# Patient Record
Sex: MALE | Race: WHITE | NOT HISPANIC OR LATINO | Employment: FULL TIME | URBAN - METROPOLITAN AREA
[De-identification: names, ages, dates, MRNs, and addresses within clinical notes are randomized per-mention and may not be internally consistent; named-entity substitution may affect disease eponyms.]

---

## 2017-09-22 ENCOUNTER — HOSPITAL ENCOUNTER (OUTPATIENT)
Dept: RADIOLOGY | Facility: HOSPITAL | Age: 42
Discharge: HOME | End: 2017-09-22
Attending: NURSE PRACTITIONER
Payer: MEDICAID

## 2017-09-22 DIAGNOSIS — M25.522 ARTHRALGIA OF ELBOW, LEFT: ICD-10-CM

## 2017-09-22 PROCEDURE — 73080 X-RAY EXAM OF ELBOW: CPT | Mod: 26,LT,, | Performed by: RADIOLOGY

## 2017-09-22 PROCEDURE — 73080 X-RAY EXAM OF ELBOW: CPT | Mod: TC,LT

## 2018-04-13 ENCOUNTER — OFFICE VISIT (OUTPATIENT)
Dept: FAMILY MEDICINE | Facility: CLINIC | Age: 43
End: 2018-04-13
Payer: COMMERCIAL

## 2018-04-13 ENCOUNTER — HOSPITAL ENCOUNTER (OUTPATIENT)
Dept: RADIOLOGY | Facility: HOSPITAL | Age: 43
Discharge: HOME | End: 2018-04-13
Attending: FAMILY MEDICINE
Payer: COMMERCIAL

## 2018-04-13 VITALS
DIASTOLIC BLOOD PRESSURE: 94 MMHG | HEART RATE: 60 BPM | HEIGHT: 72 IN | SYSTOLIC BLOOD PRESSURE: 144 MMHG | BODY MASS INDEX: 29.68 KG/M2 | RESPIRATION RATE: 20 BRPM | WEIGHT: 219.13 LBS | TEMPERATURE: 97 F

## 2018-04-13 DIAGNOSIS — R07.9 CHEST PAIN, UNSPECIFIED TYPE: ICD-10-CM

## 2018-04-13 DIAGNOSIS — R07.9 CHEST PAIN, UNSPECIFIED TYPE: Primary | ICD-10-CM

## 2018-04-13 DIAGNOSIS — F17.210 MODERATE SMOKER (20 OR LESS PER DAY): ICD-10-CM

## 2018-04-13 DIAGNOSIS — I10 ESSENTIAL HYPERTENSION: ICD-10-CM

## 2018-04-13 PROCEDURE — 93005 ELECTROCARDIOGRAM TRACING: CPT | Mod: S$GLB,,, | Performed by: FAMILY MEDICINE

## 2018-04-13 PROCEDURE — 3080F DIAST BP >= 90 MM HG: CPT | Mod: CPTII,S$GLB,, | Performed by: FAMILY MEDICINE

## 2018-04-13 PROCEDURE — 99203 OFFICE O/P NEW LOW 30 MIN: CPT | Mod: S$GLB,,, | Performed by: FAMILY MEDICINE

## 2018-04-13 PROCEDURE — 3077F SYST BP >= 140 MM HG: CPT | Mod: CPTII,S$GLB,, | Performed by: FAMILY MEDICINE

## 2018-04-13 PROCEDURE — 93010 ELECTROCARDIOGRAM REPORT: CPT | Mod: S$GLB,,, | Performed by: INTERNAL MEDICINE

## 2018-04-13 PROCEDURE — 99999 PR PBB SHADOW E&M-EST. PATIENT-LVL III: CPT | Mod: PBBFAC,,, | Performed by: FAMILY MEDICINE

## 2018-04-13 PROCEDURE — 71046 X-RAY EXAM CHEST 2 VIEWS: CPT | Mod: 26,,, | Performed by: RADIOLOGY

## 2018-04-13 PROCEDURE — 71046 X-RAY EXAM CHEST 2 VIEWS: CPT | Mod: TC,FY,PO

## 2018-04-13 RX ORDER — VARENICLINE TARTRATE 0.5 (11)-1
KIT ORAL
Qty: 1 PACKAGE | Refills: 0 | Status: SHIPPED | OUTPATIENT
Start: 2018-04-13 | End: 2018-06-08

## 2018-04-13 RX ORDER — LISINOPRIL AND HYDROCHLOROTHIAZIDE 10; 12.5 MG/1; MG/1
1 TABLET ORAL DAILY
Qty: 90 TABLET | Refills: 3 | Status: SHIPPED | OUTPATIENT
Start: 2018-04-13 | End: 2021-04-09

## 2018-04-13 NOTE — PROGRESS NOTES
Chief Complaint:    Chief Complaint   Patient presents with    [facility]    Hypertension    Chest Pain     on Monday with deep breath       History of Present Illness:  Patient is new to the practice.  He says few days back he developed chest pain that lasted off and on for almost all day and was associated with deep breathing.  He denies any fever cough congestion diaphoresis no chest pain associated with exertion or walking.  No known history of heart disease.    He was diagnosed with hypertension and started on amlodipine about a year or so back he developed edema in his legs so he stopped using the medication.    He is also smoker he does want to quit he has about a 31-pack-year history of smoking      ROS:  Review of Systems   Constitutional: Negative for activity change, chills, fatigue, fever and unexpected weight change.   HENT: Negative for congestion, ear discharge, ear pain, hearing loss, postnasal drip and rhinorrhea.    Eyes: Negative for pain and visual disturbance.   Respiratory: Negative for cough, chest tightness and shortness of breath.    Cardiovascular: Negative for chest pain and palpitations.   Gastrointestinal: Negative for abdominal pain, diarrhea and vomiting.   Endocrine: Negative for heat intolerance.   Genitourinary: Negative for dysuria, flank pain, frequency and hematuria.   Musculoskeletal: Negative for back pain, gait problem and neck pain.   Skin: Negative for color change and rash.   Neurological: Negative for dizziness, tremors, seizures, numbness and headaches.   Psychiatric/Behavioral: Negative for agitation, hallucinations, self-injury, sleep disturbance and suicidal ideas. The patient is not nervous/anxious.        Past Medical History:   Diagnosis Date    Hypertension        Social History:  Social History     Social History    Marital status: Single     Spouse name: N/A    Number of children: N/A    Years of education: N/A     Social History Main Topics     "Smoking status: Current Every Day Smoker     Packs/day: 1.00    Smokeless tobacco: Current User     Types: Snuff    Alcohol use 4.8 oz/week     6 Cans of beer, 2 Standard drinks or equivalent per week    Drug use: No    Sexual activity: Yes     Partners: Female     Other Topics Concern    None     Social History Narrative    None       Family History:   family history includes Cirrhosis in his father.    Health Maintenance   Topic Date Due    Lipid Panel  1974    TETANUS VACCINE  04/26/1992    Pneumococcal PPSV23 (Medium Risk) (1) 04/26/1992    Influenza Vaccine  08/01/2017       Physical Exam:    Vital Signs  Temp: 97.3 °F (36.3 °C)  Temp src: Tympanic  Pulse: 60  Resp: 20  BP: (!) 144/94  BP Location: Left arm  Patient Position: Sitting  Pain Score: 0-No pain  Height and Weight  Height: 5' 11.75" (182.2 cm)  Weight: 99.4 kg (219 lb 2.2 oz)  BSA (Calculated - sq m): 2.24 sq meters  BMI (Calculated): 30  Weight in (lb) to have BMI = 25: 182.7]    Body mass index is 29.93 kg/m².    Physical Exam   Constitutional: He is oriented to person, place, and time. He appears well-developed.   Eyes: Conjunctivae are normal. Pupils are equal, round, and reactive to light.   Neck: Normal range of motion. Neck supple.   Cardiovascular: Normal rate, regular rhythm and normal heart sounds.    No murmur heard.  Pulmonary/Chest: Effort normal and breath sounds normal. No respiratory distress. He has no wheezes. He has no rales. He exhibits no tenderness.   Abdominal: Soft. He exhibits no distension and no mass. There is no tenderness. There is no guarding.   Musculoskeletal: He exhibits no edema or tenderness.   Lymphadenopathy:     He has no cervical adenopathy.   Neurological: He is alert and oriented to person, place, and time. He has normal reflexes.   Skin: Skin is warm and dry.   Psychiatric: He has a normal mood and affect. His behavior is normal. Judgment and thought content normal.       No results found for: " CHOL, TRIG, HDL, TOTALCHOLEST, NONHDLCHOL    No results found for: HGBA1C    Assessment:      ICD-10-CM ICD-9-CM   1. Chest pain, unspecified type R07.9 786.50   2. Essential hypertension I10 401.9   3. Moderate smoker (20 or less per day) F17.210 305.1         Plan:  1.  Chest pain most likely right in nature however he is a high-risk patient because of his smoking history and hypertension will get a treadmill test.  Will also do a chest x-ray EKG.  2.  Hypertension we will start him on Prinzide 10-12 0.5 daily please start monitoring blood pressure carefully recheck a BMP at his next visit bring the readings  3.  Tobaccoism start on Chantix side effects of Chantix discussed stop the medicine if adverse effect including but not limited to depression nightmares and suicidal ideation develops call emergency services  Follow up in one month.  Orders Placed This Encounter   Procedures    X-Ray Chest PA And Lateral    CBC auto differential    Comprehensive metabolic panel    Hemoglobin A1c    Lipid panel    Cardiac treadmill stress test    EKG 12-lead       Current Outpatient Prescriptions   Medication Sig Dispense Refill    lisinopril-hydrochlorothiazide (PRINZIDE,ZESTORETIC) 10-12.5 mg per tablet Take 1 tablet by mouth once daily. 90 tablet 3    varenicline (CHANTIX STARTING MONTH BOX) 0.5 mg (11)- 1 mg (42) tablet Take one 0.5mg tab by mouth once daily X3 days,then increase to one 0.5mg tab twice daily X4 days,then increase to one 1mg tab twice daily 1 Package 0     No current facility-administered medications for this visit.        There are no discontinued medications.    Follow-up in about 2 weeks (around 4/27/2018).      Alexsandra Howe MD

## 2018-04-16 ENCOUNTER — TELEPHONE (OUTPATIENT)
Dept: FAMILY MEDICINE | Facility: CLINIC | Age: 43
End: 2018-04-16

## 2018-04-16 DIAGNOSIS — R93.89 ABNORMAL CHEST X-RAY: Primary | ICD-10-CM

## 2018-05-02 ENCOUNTER — OFFICE VISIT (OUTPATIENT)
Dept: FAMILY MEDICINE | Facility: CLINIC | Age: 43
End: 2018-05-02
Payer: COMMERCIAL

## 2018-05-02 ENCOUNTER — CLINICAL SUPPORT (OUTPATIENT)
Dept: CARDIOLOGY | Facility: CLINIC | Age: 43
End: 2018-05-02
Attending: FAMILY MEDICINE
Payer: COMMERCIAL

## 2018-05-02 ENCOUNTER — HOSPITAL ENCOUNTER (OUTPATIENT)
Dept: RADIOLOGY | Facility: HOSPITAL | Age: 43
Discharge: HOME | End: 2018-05-02
Attending: FAMILY MEDICINE
Payer: COMMERCIAL

## 2018-05-02 VITALS
BODY MASS INDEX: 29.62 KG/M2 | OXYGEN SATURATION: 95 % | SYSTOLIC BLOOD PRESSURE: 113 MMHG | DIASTOLIC BLOOD PRESSURE: 73 MMHG | TEMPERATURE: 98 F | HEART RATE: 93 BPM | WEIGHT: 218.69 LBS | HEIGHT: 72 IN

## 2018-05-02 DIAGNOSIS — B18.2 CHRONIC HEPATITIS C WITHOUT HEPATIC COMA: ICD-10-CM

## 2018-05-02 DIAGNOSIS — F19.91 HISTORY OF ILLICIT DRUG USE: ICD-10-CM

## 2018-05-02 DIAGNOSIS — E78.2 MIXED HYPERLIPIDEMIA: ICD-10-CM

## 2018-05-02 DIAGNOSIS — I10 ESSENTIAL HYPERTENSION: Primary | ICD-10-CM

## 2018-05-02 DIAGNOSIS — R93.89 ABNORMAL CHEST X-RAY: ICD-10-CM

## 2018-05-02 DIAGNOSIS — R07.9 CHEST PAIN, UNSPECIFIED TYPE: ICD-10-CM

## 2018-05-02 DIAGNOSIS — F17.210 CIGARETTE SMOKER MOTIVATED TO QUIT: ICD-10-CM

## 2018-05-02 PROCEDURE — 90471 IMMUNIZATION ADMIN: CPT | Mod: S$GLB,,, | Performed by: FAMILY MEDICINE

## 2018-05-02 PROCEDURE — 3074F SYST BP LT 130 MM HG: CPT | Mod: CPTII,S$GLB,, | Performed by: FAMILY MEDICINE

## 2018-05-02 PROCEDURE — 99999 PR PBB SHADOW E&M-EST. PATIENT-LVL III: CPT | Mod: PBBFAC,,, | Performed by: FAMILY MEDICINE

## 2018-05-02 PROCEDURE — 90715 TDAP VACCINE 7 YRS/> IM: CPT | Mod: S$GLB,,, | Performed by: FAMILY MEDICINE

## 2018-05-02 PROCEDURE — 71048 X-RAY EXAM CHEST 4+ VIEWS: CPT | Mod: TC,PO

## 2018-05-02 PROCEDURE — 71048 X-RAY EXAM CHEST 4+ VIEWS: CPT | Mod: 26,,, | Performed by: RADIOLOGY

## 2018-05-02 PROCEDURE — 99214 OFFICE O/P EST MOD 30 MIN: CPT | Mod: 25,S$GLB,, | Performed by: FAMILY MEDICINE

## 2018-05-02 PROCEDURE — 93015 CV STRESS TEST SUPVJ I&R: CPT | Mod: S$GLB,,, | Performed by: INTERNAL MEDICINE

## 2018-05-02 PROCEDURE — 3078F DIAST BP <80 MM HG: CPT | Mod: CPTII,S$GLB,, | Performed by: FAMILY MEDICINE

## 2018-05-02 RX ORDER — IBUPROFEN 200 MG
1 TABLET ORAL DAILY
Qty: 14 PATCH | Refills: 2 | Status: SHIPPED | OUTPATIENT
Start: 2018-05-02 | End: 2018-06-08 | Stop reason: ALTCHOICE

## 2018-05-02 NOTE — PROGRESS NOTES
Chief Complaint:    Chief Complaint   Patient presents with    Follow-up       History of Present Illness:    Patient is here for follow-up doing well and has not had any more tests and blood pressure normal taking the medicine no side effects.  His liver enzymes are elevated he tells me that he has hepatitis C he is never sought treatment.    He tried Chantix but it made him very tired so she quit using it.  But he wants to try the nicotine patch.  Does not want to go for smoking cessation counseling    History of illicit drug abuse and IV drug abuse in the past including methamphetamine, and marijuana still occasionally smokes marijuana.    ROS:  Review of Systems   Constitutional: Negative for activity change, chills, fatigue, fever and unexpected weight change.   HENT: Negative for congestion, ear discharge, ear pain, hearing loss, postnasal drip and rhinorrhea.    Eyes: Negative for pain and visual disturbance.   Respiratory: Negative for cough, chest tightness and shortness of breath.    Cardiovascular: Negative for chest pain and palpitations.   Gastrointestinal: Negative for abdominal pain, diarrhea and vomiting.   Endocrine: Negative for heat intolerance.   Genitourinary: Negative for dysuria, flank pain, frequency and hematuria.   Musculoskeletal: Negative for back pain, gait problem and neck pain.   Skin: Negative for color change and rash.   Neurological: Negative for dizziness, tremors, seizures, numbness and headaches.   Psychiatric/Behavioral: Negative for agitation, hallucinations, self-injury, sleep disturbance and suicidal ideas. The patient is not nervous/anxious.        Past Medical History:   Diagnosis Date    Hypertension        Social History:  Social History     Social History    Marital status: Single     Spouse name: N/A    Number of children: N/A    Years of education: N/A     Social History Main Topics    Smoking status: Current Every Day Smoker     Packs/day: 1.00    Smokeless  tobacco: Current User     Types: Snuff    Alcohol use 4.8 oz/week     6 Cans of beer, 2 Standard drinks or equivalent per week    Drug use: No    Sexual activity: Yes     Partners: Female     Other Topics Concern    None     Social History Narrative    None       Family History:   family history includes Cirrhosis in his father.    Health Maintenance   Topic Date Due    TETANUS VACCINE  04/26/1992    Pneumococcal PPSV23 (Medium Risk) (1) 04/26/1992    Influenza Vaccine  08/01/2018    Lipid Panel  04/13/2023       Physical Exam:    Vital Signs  Temp: 97.8 °F (36.6 °C)  Temp src: Tympanic  Pulse: 93  SpO2: 95 %  BP: 113/73  BP Location: Left arm  Patient Position: Sitting  Pain Score: 0-No pain  Height and Weight  Height: 6' (182.9 cm)  Weight: 99.2 kg (218 lb 11.1 oz)  BSA (Calculated - sq m): 2.24 sq meters  BMI (Calculated): 29.7  Weight in (lb) to have BMI = 25: 183.9]    Body mass index is 29.66 kg/m².    Physical Exam   Constitutional: He is oriented to person, place, and time. He appears well-developed.   HENT:   Mouth/Throat: Oropharynx is clear and moist.   Eyes: Conjunctivae are normal. Pupils are equal, round, and reactive to light.   Neck: Normal range of motion. Neck supple.   Cardiovascular: Normal rate, regular rhythm and normal heart sounds.    No murmur heard.  Pulmonary/Chest: Effort normal and breath sounds normal. No respiratory distress. He has no wheezes. He has no rales. He exhibits no tenderness.   Abdominal: Soft. He exhibits no distension and no mass. There is no tenderness. There is no guarding.   Musculoskeletal: He exhibits no edema or tenderness.   Lymphadenopathy:     He has no cervical adenopathy.   Neurological: He is alert and oriented to person, place, and time. He has normal reflexes.   Skin: Skin is warm and dry.   Psychiatric: He has a normal mood and affect. His behavior is normal. Judgment and thought content normal.       Results for orders placed or performed in visit  on 04/13/18   CBC auto differential   Result Value Ref Range    WBC 7.55 3.90 - 12.70 K/uL    RBC 5.59 4.60 - 6.20 M/uL    Hemoglobin 17.1 14.0 - 18.0 g/dL    Hematocrit 50.0 40.0 - 54.0 %    MCV 89 82 - 98 fL    MCH 30.6 27.0 - 31.0 pg    MCHC 34.2 32.0 - 36.0 g/dL    RDW 12.6 11.5 - 14.5 %    Platelets 264 150 - 350 K/uL    MPV 10.8 9.2 - 12.9 fL    Immature Granulocytes 0.3 0.0 - 0.5 %    Gran # (ANC) 4.0 1.8 - 7.7 K/uL    Immature Grans (Abs) 0.02 0.00 - 0.04 K/uL    Lymph # 2.7 1.0 - 4.8 K/uL    Mono # 0.7 0.3 - 1.0 K/uL    Eos # 0.1 0.0 - 0.5 K/uL    Baso # 0.07 0.00 - 0.20 K/uL    nRBC 0 0 /100 WBC    Gran% 52.8 38.0 - 73.0 %    Lymph% 35.1 18.0 - 48.0 %    Mono% 9.0 4.0 - 15.0 %    Eosinophil% 1.9 0.0 - 8.0 %    Basophil% 0.9 0.0 - 1.9 %    Differential Method Automated    Comprehensive metabolic panel   Result Value Ref Range    Sodium 141 136 - 145 mmol/L    Potassium 4.1 3.5 - 5.1 mmol/L    Chloride 107 95 - 110 mmol/L    CO2 26 23 - 29 mmol/L    Glucose 83 70 - 110 mg/dL    BUN, Bld 9 6 - 20 mg/dL    Creatinine 0.8 0.5 - 1.4 mg/dL    Calcium 9.9 8.7 - 10.5 mg/dL    Total Protein 8.0 6.0 - 8.4 g/dL    Albumin 4.1 3.5 - 5.2 g/dL    Total Bilirubin 0.7 0.1 - 1.0 mg/dL    Alkaline Phosphatase 81 55 - 135 U/L    AST 62 (H) 10 - 40 U/L     (H) 10 - 44 U/L    Anion Gap 8 8 - 16 mmol/L    eGFR if African American >60.0 >60 mL/min/1.73 m^2    eGFR if non African American >60.0 >60 mL/min/1.73 m^2   Hemoglobin A1c   Result Value Ref Range    Hemoglobin A1C 5.4 4.0 - 5.6 %    Estimated Avg Glucose 108 68 - 131 mg/dL   Lipid panel   Result Value Ref Range    Cholesterol 194 120 - 199 mg/dL    Triglycerides 171 (H) 30 - 150 mg/dL    HDL 30 (L) 40 - 75 mg/dL    LDL Cholesterol 129.8 63.0 - 159.0 mg/dL    HDL/Chol Ratio 15.5 (L) 20.0 - 50.0 %    Total Cholesterol/HDL Ratio 6.5 (H) 2.0 - 5.0    Non-HDL Cholesterol 164 mg/dL         Lab Results   Component Value Date    HGBA1C 5.4 04/13/2018       Assessment:       ICD-10-CM ICD-9-CM   1. Essential hypertension I10 401.9   2. Chronic hepatitis C without hepatic coma B18.2 070.54   3. Mixed hyperlipidemia E78.2 272.2   4. History of illicit drug use Z87.898 305.90   5. Cigarette smoker motivated to quit F17.200 305.1         Plan:    Continue current medications and plan.  We'll start him on nicotine patch 21 mg for 2 weeks then stepping down to 14 for 4 weeks and 6 then 7 mg for 8 weeks.  He'll be referred to Dr. Porras for hepatitis C treatment, hep C RNA PCR pending.  Follow-up 6 months      Orders Placed This Encounter   Procedures    (In Office Administered) Tdap Vaccine    Hepatitis C RNA, quantitative, PCR    Comprehensive metabolic panel    Ambulatory referral to Gastroenterology       Current Outpatient Prescriptions   Medication Sig Dispense Refill    lisinopril-hydrochlorothiazide (PRINZIDE,ZESTORETIC) 10-12.5 mg per tablet Take 1 tablet by mouth once daily. 90 tablet 3    nicotine (NICODERM CQ) 21 mg/24 hr Place 1 patch onto the skin once daily. 14 patch 2    varenicline (CHANTIX STARTING MONTH BOX) 0.5 mg (11)- 1 mg (42) tablet Take one 0.5mg tab by mouth once daily X3 days,then increase to one 0.5mg tab twice daily X4 days,then increase to one 1mg tab twice daily 1 Package 0     No current facility-administered medications for this visit.        There are no discontinued medications.    Follow-up in about 6 months (around 11/2/2018).      Alexsandra Howe MD

## 2018-05-03 LAB — DIASTOLIC DYSFUNCTION: NO

## 2018-06-08 ENCOUNTER — OFFICE VISIT (OUTPATIENT)
Dept: GASTROENTEROLOGY | Facility: CLINIC | Age: 43
End: 2018-06-08
Payer: COMMERCIAL

## 2018-06-08 ENCOUNTER — LAB VISIT (OUTPATIENT)
Dept: LAB | Facility: HOSPITAL | Age: 43
End: 2018-06-08
Attending: INTERNAL MEDICINE
Payer: COMMERCIAL

## 2018-06-08 VITALS
HEART RATE: 80 BPM | BODY MASS INDEX: 30.75 KG/M2 | DIASTOLIC BLOOD PRESSURE: 90 MMHG | HEIGHT: 72 IN | SYSTOLIC BLOOD PRESSURE: 130 MMHG | WEIGHT: 227.06 LBS

## 2018-06-08 DIAGNOSIS — B18.2 CHRONIC HEPATITIS C WITHOUT HEPATIC COMA: ICD-10-CM

## 2018-06-08 DIAGNOSIS — B18.2 CHRONIC HEPATITIS C WITHOUT HEPATIC COMA: Primary | ICD-10-CM

## 2018-06-08 PROCEDURE — 86704 HEP B CORE ANTIBODY TOTAL: CPT

## 2018-06-08 PROCEDURE — 99999 PR PBB SHADOW E&M-EST. PATIENT-LVL III: CPT | Mod: PBBFAC,,, | Performed by: INTERNAL MEDICINE

## 2018-06-08 PROCEDURE — 86703 HIV-1/HIV-2 1 RESULT ANTBDY: CPT

## 2018-06-08 PROCEDURE — 87902 NFCT AGT GNTYP ALYS HEP C: CPT

## 2018-06-08 PROCEDURE — 3080F DIAST BP >= 90 MM HG: CPT | Mod: CPTII,S$GLB,, | Performed by: INTERNAL MEDICINE

## 2018-06-08 PROCEDURE — 87340 HEPATITIS B SURFACE AG IA: CPT

## 2018-06-08 PROCEDURE — 87522 HEPATITIS C REVRS TRNSCRPJ: CPT

## 2018-06-08 PROCEDURE — 86790 VIRUS ANTIBODY NOS: CPT

## 2018-06-08 PROCEDURE — 3008F BODY MASS INDEX DOCD: CPT | Mod: CPTII,S$GLB,, | Performed by: INTERNAL MEDICINE

## 2018-06-08 PROCEDURE — 86706 HEP B SURFACE ANTIBODY: CPT

## 2018-06-08 PROCEDURE — 82247 BILIRUBIN TOTAL: CPT

## 2018-06-08 PROCEDURE — 99204 OFFICE O/P NEW MOD 45 MIN: CPT | Mod: S$GLB,,, | Performed by: INTERNAL MEDICINE

## 2018-06-08 PROCEDURE — 3075F SYST BP GE 130 - 139MM HG: CPT | Mod: CPTII,S$GLB,, | Performed by: INTERNAL MEDICINE

## 2018-06-08 PROCEDURE — 36415 COLL VENOUS BLD VENIPUNCTURE: CPT | Mod: PO

## 2018-06-08 NOTE — PROGRESS NOTES
Subjective:     Adonay Quiñonez II is here for evaluation of hepatitis C.    HPI  Adonay Quiñonez II has HCV. Overall feeling well, no significant liver related issues.    Previous treatment: none  Previous biopsy: none  EtOH: remote, only social recent    Abstinent of heavy drug use for 2 years.No evidence of liver decompensation: no ascites, confusion or GI bleeding.      Patient is interested in being treated.    Review of Systems   Constitutional: Negative.    HENT: Negative.    Eyes: Negative.    Respiratory: Negative.    Cardiovascular: Negative.    Gastrointestinal: Negative.    Genitourinary: Negative.    Musculoskeletal: Negative.    Skin: Negative.    Neurological: Negative.    Psychiatric/Behavioral: Negative.        Objective:     Physical Exam   Constitutional: He is oriented to person, place, and time. He appears well-developed and well-nourished. No distress.   HENT:   Head: Normocephalic.   Mouth/Throat: Oropharynx is clear and moist. No oropharyngeal exudate.   Poor dentition   Eyes: Conjunctivae are normal. Pupils are equal, round, and reactive to light. Right eye exhibits no discharge. Left eye exhibits no discharge. No scleral icterus.   Pulmonary/Chest: Effort normal and breath sounds normal. No respiratory distress. He has no wheezes.   Abdominal: Soft. He exhibits no distension. There is no tenderness.   Musculoskeletal: He exhibits no edema.   Neurological: He is alert and oriented to person, place, and time.   Psychiatric: He has a normal mood and affect. His behavior is normal.   Vitals reviewed.      Computed MELD-Na score unavailable. Necessary lab results were not found in the last year.  Computed MELD score unavailable. Necessary lab results were not found in the last year.    WBC   Date Value Ref Range Status   04/13/2018 7.55 3.90 - 12.70 K/uL Final     Hemoglobin   Date Value Ref Range Status   04/13/2018 17.1 14.0 - 18.0 g/dL Final     Hematocrit   Date Value Ref Range Status  "  04/13/2018 50.0 40.0 - 54.0 % Final     Platelets   Date Value Ref Range Status   04/13/2018 264 150 - 350 K/uL Final     BUN, Bld   Date Value Ref Range Status   05/02/2018 11 6 - 20 mg/dL Final     Creatinine   Date Value Ref Range Status   05/02/2018 0.8 0.5 - 1.4 mg/dL Final     Glucose   Date Value Ref Range Status   05/02/2018 59 (L) 70 - 110 mg/dL Final     Calcium   Date Value Ref Range Status   05/02/2018 10.3 8.7 - 10.5 mg/dL Final     Sodium   Date Value Ref Range Status   05/02/2018 140 136 - 145 mmol/L Final     Potassium   Date Value Ref Range Status   05/02/2018 4.2 3.5 - 5.1 mmol/L Final     Chloride   Date Value Ref Range Status   05/02/2018 106 95 - 110 mmol/L Final     AST   Date Value Ref Range Status   05/02/2018 66 (H) 10 - 40 U/L Final     ALT   Date Value Ref Range Status   05/02/2018 138 (H) 10 - 44 U/L Final     Alkaline Phosphatase   Date Value Ref Range Status   05/02/2018 74 55 - 135 U/L Final     Total Bilirubin   Date Value Ref Range Status   05/02/2018 0.7 0.1 - 1.0 mg/dL Final     Comment:     For infants and newborns, interpretation of results should be based  on gestational age, weight and in agreement with clinical  observations.  Premature Infant recommended reference ranges:  Up to 24 hours.............<8.0 mg/dL  Up to 48 hours............<12.0 mg/dL  3-5 days..................<15.0 mg/dL  6-29 days.................<15.0 mg/dL       Albumin   Date Value Ref Range Status   05/02/2018 4.2 3.5 - 5.2 g/dL Final     INR   Date Value Ref Range Status   04/12/2011 1.0 0.8 - 1.2 Final     Comment:     ACCP Guideline for Coumadin usage recommends a "target range" of  2.0 - 3.0 for INR for all indicators except mechanical heart valves  and antiphospholipid syndromes which should use 2.5 - 3.5.  .         Assessment/Plan:     1. Chronic hepatitis C without hepatic coma      Adonay Quiñonez II is a 43 y.o. male withHepatitis C (referred by Dr. Alexsandra Howe)    Chronic hepatitis C " without hepatic coma  -Needs eval and staging  -Discussed HCV diagnosis   -Educated patient on HCV  -Advised alcohol abstinence  -     Hepatitis B surface antibody; Future; Expected date: 06/08/2018  -     Hepatitis B surface antigen; Future; Expected date: 06/08/2018  -     Hepatitis B core antibody, total; Future; Expected date: 06/08/2018  -     HCV FibroSURE; Future; Expected date: 06/08/2018  -     Hepatitis C genotype; Future; Expected date: 06/08/2018  -     HIV-1 and HIV-2 antibodies; Future; Expected date: 06/08/2018  -     US Abdomen Limited; Future; Expected date: 06/08/2018  -     Hepatitis A antibody, IgG; Future; Expected date: 06/08/2018    RTC in 3 months    Angélica Porras MD

## 2018-06-11 LAB
HBV CORE AB SERPL QL IA: NEGATIVE
HBV SURFACE AG SERPL QL IA: NEGATIVE
HEPATITIS A ANTIBODY, IGG: NEGATIVE
HIV 1+2 AB+HIV1 P24 AG SERPL QL IA: NEGATIVE

## 2018-06-12 LAB — HBV SURFACE AB SER-ACNC: NEGATIVE M[IU]/ML

## 2018-06-13 DIAGNOSIS — Z23 NEED FOR HEPATITIS A AND B VACCINATION: Primary | ICD-10-CM

## 2018-06-13 LAB
A2 MACROGLOB SERPL-MCNC: 439 MG/DL
ALT SERPL W P-5'-P-CCNC: 107 U/L (ref 7–55)
APO A-I SERPL-MCNC: 113 MG/DL
BILIRUB SERPL-MCNC: 0.3 MG/DL
BIOPREDICTIVE SERIAL NUMBER: ABNORMAL
FIBROSIS STAGE SERPL QL: ABNORMAL
FIBROTEST INTERPRETATION: ABNORMAL
FIBROTEST-ACTITEST COMMENT: ABNORMAL
GGT SERPL-CCNC: 174 U/L
HAPTOGLOB SERPL-MCNC: 135 MG/DL
HCV GENTYP SERPL NAA+PROBE: ABNORMAL
HCV RNA SERPL NAA+PROBE-LOG IU: 5.69 LOG (10) IU/ML
HCV RNA SERPL QL NAA+PROBE: DETECTED
HCV RNA SPEC NAA+PROBE-ACNC: ABNORMAL IU/ML
LIVER FIBR SCORE SERPL CALC.FIBROSURE: 0.7
NECROINFLAMMAT INTERP: ABNORMAL
NECROINFLAMMATORY ACT GRADE SERPL QL: ABNORMAL
NECROINFLAMMATORY ACT SCORE SERPL: 0.73

## 2018-06-25 ENCOUNTER — TELEPHONE (OUTPATIENT)
Dept: RADIOLOGY | Facility: HOSPITAL | Age: 43
End: 2018-06-25

## 2018-06-26 ENCOUNTER — APPOINTMENT (OUTPATIENT)
Dept: RADIOLOGY | Facility: HOSPITAL | Age: 43
End: 2018-06-26
Attending: INTERNAL MEDICINE
Payer: COMMERCIAL

## 2018-06-26 DIAGNOSIS — B18.2 CHRONIC HEPATITIS C WITHOUT HEPATIC COMA: ICD-10-CM

## 2018-06-26 PROCEDURE — 76705 ECHO EXAM OF ABDOMEN: CPT | Mod: 26,,, | Performed by: RADIOLOGY

## 2018-06-26 PROCEDURE — 76705 ECHO EXAM OF ABDOMEN: CPT | Mod: TC,PO

## 2018-07-10 ENCOUNTER — TELEPHONE (OUTPATIENT)
Dept: GASTROENTEROLOGY | Facility: CLINIC | Age: 43
End: 2018-07-10

## 2018-07-10 NOTE — TELEPHONE ENCOUNTER
Left message on answering machine to return a call to Dr. Porras's office at [facility], imaging results,[facility].

## 2018-07-11 RX ORDER — NICOTINE 7MG/24HR
1 PATCH, TRANSDERMAL 24 HOURS TRANSDERMAL DAILY
Qty: 14 PATCH | Refills: 0 | Status: SHIPPED | OUTPATIENT
Start: 2018-07-11 | End: 2018-08-01 | Stop reason: DRUGHIGH

## 2018-08-01 RX ORDER — IBUPROFEN 200 MG
1 TABLET ORAL DAILY
Qty: 14 PATCH | Refills: 0 | COMMUNITY
Start: 2018-08-01 | End: 2018-08-02 | Stop reason: SDUPTHER

## 2018-08-01 RX ORDER — NICOTINE 7MG/24HR
1 PATCH, TRANSDERMAL 24 HOURS TRANSDERMAL DAILY
Qty: 14 PATCH | Refills: 0 | Status: CANCELLED | OUTPATIENT
Start: 2018-08-01

## 2018-08-02 ENCOUNTER — TELEPHONE (OUTPATIENT)
Dept: FAMILY MEDICINE | Facility: CLINIC | Age: 43
End: 2018-08-02

## 2018-08-02 RX ORDER — IBUPROFEN 200 MG
1 TABLET ORAL DAILY
Qty: 14 PATCH | Refills: 1 | Status: SHIPPED | OUTPATIENT
Start: 2018-08-02 | End: 2021-04-09

## 2018-08-02 NOTE — TELEPHONE ENCOUNTER
----- Message from Vilma Stockton sent at 8/2/2018  1:13 PM CDT -----  Contact: Pt Wife  Pt wife is calling regarding  script refill on patches nicotine (NICODERM CQ) 21 mg/24 hr Pt wife stated that Pt will have to start over  at [facility]. Pt forgot patches while wife was in ICU having baby lead to Pt  smoking again. ..[phone] (home)         ...  [id number] - [place] - [address] [address]  Phone: [phone] Fax: [phone]

## 2021-04-09 ENCOUNTER — OFFICE VISIT (OUTPATIENT)
Dept: FAMILY MEDICINE | Facility: CLINIC | Age: 46
End: 2021-04-09
Payer: COMMERCIAL

## 2021-04-09 VITALS
BODY MASS INDEX: 29.48 KG/M2 | SYSTOLIC BLOOD PRESSURE: 160 MMHG | DIASTOLIC BLOOD PRESSURE: 110 MMHG | WEIGHT: 217.63 LBS | HEIGHT: 72 IN | HEART RATE: 89 BPM | TEMPERATURE: 98 F | OXYGEN SATURATION: 97 %

## 2021-04-09 DIAGNOSIS — I16.0 ASYMPTOMATIC HYPERTENSIVE URGENCY: Primary | ICD-10-CM

## 2021-04-09 DIAGNOSIS — L84 CALLOSITY: ICD-10-CM

## 2021-04-09 DIAGNOSIS — B18.2 CHRONIC HEPATITIS C WITHOUT HEPATIC COMA: ICD-10-CM

## 2021-04-09 DIAGNOSIS — F17.200 SMOKER: ICD-10-CM

## 2021-04-09 PROCEDURE — 3008F PR BODY MASS INDEX (BMI) DOCUMENTED: ICD-10-PCS | Mod: CPTII,S$GLB,, | Performed by: FAMILY MEDICINE

## 2021-04-09 PROCEDURE — 3008F BODY MASS INDEX DOCD: CPT | Mod: CPTII,S$GLB,, | Performed by: FAMILY MEDICINE

## 2021-04-09 PROCEDURE — 1126F PR PAIN SEVERITY QUANTIFIED, NO PAIN PRESENT: ICD-10-PCS | Mod: S$GLB,,, | Performed by: FAMILY MEDICINE

## 2021-04-09 PROCEDURE — 3080F DIAST BP >= 90 MM HG: CPT | Mod: CPTII,S$GLB,, | Performed by: FAMILY MEDICINE

## 2021-04-09 PROCEDURE — 99214 OFFICE O/P EST MOD 30 MIN: CPT | Mod: S$GLB,,, | Performed by: FAMILY MEDICINE

## 2021-04-09 PROCEDURE — 99999 PR PBB SHADOW E&M-EST. PATIENT-LVL IV: ICD-10-PCS | Mod: PBBFAC,,, | Performed by: FAMILY MEDICINE

## 2021-04-09 PROCEDURE — 1126F AMNT PAIN NOTED NONE PRSNT: CPT | Mod: S$GLB,,, | Performed by: FAMILY MEDICINE

## 2021-04-09 PROCEDURE — 3080F PR MOST RECENT DIASTOLIC BLOOD PRESSURE >= 90 MM HG: ICD-10-PCS | Mod: CPTII,S$GLB,, | Performed by: FAMILY MEDICINE

## 2021-04-09 PROCEDURE — 3077F SYST BP >= 140 MM HG: CPT | Mod: CPTII,S$GLB,, | Performed by: FAMILY MEDICINE

## 2021-04-09 PROCEDURE — 99999 PR PBB SHADOW E&M-EST. PATIENT-LVL IV: CPT | Mod: PBBFAC,,, | Performed by: FAMILY MEDICINE

## 2021-04-09 PROCEDURE — 3077F PR MOST RECENT SYSTOLIC BLOOD PRESSURE >= 140 MM HG: ICD-10-PCS | Mod: CPTII,S$GLB,, | Performed by: FAMILY MEDICINE

## 2021-04-09 PROCEDURE — 99214 PR OFFICE/OUTPT VISIT, EST, LEVL IV, 30-39 MIN: ICD-10-PCS | Mod: S$GLB,,, | Performed by: FAMILY MEDICINE

## 2021-04-09 RX ORDER — LOSARTAN POTASSIUM AND HYDROCHLOROTHIAZIDE 12.5; 5 MG/1; MG/1
1 TABLET ORAL DAILY
Qty: 30 TABLET | Refills: 0 | Status: SHIPPED | OUTPATIENT
Start: 2021-04-09 | End: 2021-05-28 | Stop reason: SDUPTHER

## 2021-05-10 ENCOUNTER — PATIENT MESSAGE (OUTPATIENT)
Dept: RESEARCH | Facility: HOSPITAL | Age: 46
End: 2021-05-10

## 2021-05-28 ENCOUNTER — OFFICE VISIT (OUTPATIENT)
Dept: FAMILY MEDICINE | Facility: CLINIC | Age: 46
End: 2021-05-28
Payer: COMMERCIAL

## 2021-05-28 ENCOUNTER — LAB VISIT (OUTPATIENT)
Dept: LAB | Facility: HOSPITAL | Age: 46
End: 2021-05-28
Attending: FAMILY MEDICINE
Payer: COMMERCIAL

## 2021-05-28 VITALS
DIASTOLIC BLOOD PRESSURE: 88 MMHG | OXYGEN SATURATION: 96 % | HEART RATE: 83 BPM | BODY MASS INDEX: 30.48 KG/M2 | TEMPERATURE: 98 F | HEIGHT: 72 IN | WEIGHT: 225 LBS | SYSTOLIC BLOOD PRESSURE: 132 MMHG

## 2021-05-28 DIAGNOSIS — I10 ESSENTIAL HYPERTENSION: Primary | ICD-10-CM

## 2021-05-28 DIAGNOSIS — B18.2 CHRONIC HEPATITIS C WITHOUT HEPATIC COMA: ICD-10-CM

## 2021-05-28 DIAGNOSIS — I10 ESSENTIAL HYPERTENSION: ICD-10-CM

## 2021-05-28 LAB
ALBUMIN SERPL BCP-MCNC: 3.9 G/DL (ref 3.5–5.2)
ALP SERPL-CCNC: 106 U/L (ref 55–135)
ALT SERPL W/O P-5'-P-CCNC: 105 U/L (ref 10–44)
ANION GAP SERPL CALC-SCNC: 11 MMOL/L (ref 8–16)
AST SERPL-CCNC: 52 U/L (ref 10–40)
BASOPHILS # BLD AUTO: 0.08 K/UL (ref 0–0.2)
BASOPHILS NFR BLD: 0.9 % (ref 0–1.9)
BILIRUB SERPL-MCNC: 0.4 MG/DL (ref 0.1–1)
BUN SERPL-MCNC: 12 MG/DL (ref 6–20)
CALCIUM SERPL-MCNC: 10.3 MG/DL (ref 8.7–10.5)
CHLORIDE SERPL-SCNC: 106 MMOL/L (ref 95–110)
CHOLEST SERPL-MCNC: 195 MG/DL (ref 120–199)
CHOLEST/HDLC SERPL: 7 {RATIO} (ref 2–5)
CO2 SERPL-SCNC: 24 MMOL/L (ref 23–29)
CREAT SERPL-MCNC: 0.9 MG/DL (ref 0.5–1.4)
DIFFERENTIAL METHOD: NORMAL
EOSINOPHIL # BLD AUTO: 0.1 K/UL (ref 0–0.5)
EOSINOPHIL NFR BLD: 1.6 % (ref 0–8)
ERYTHROCYTE [DISTWIDTH] IN BLOOD BY AUTOMATED COUNT: 13 % (ref 11.5–14.5)
EST. GFR  (AFRICAN AMERICAN): >60 ML/MIN/1.73 M^2
EST. GFR  (NON AFRICAN AMERICAN): >60 ML/MIN/1.73 M^2
GLUCOSE SERPL-MCNC: 156 MG/DL (ref 70–110)
HCT VFR BLD AUTO: 49.6 % (ref 40–54)
HDLC SERPL-MCNC: 28 MG/DL (ref 40–75)
HDLC SERPL: 14.4 % (ref 20–50)
HGB BLD-MCNC: 17.2 G/DL (ref 14–18)
IMM GRANULOCYTES # BLD AUTO: 0.03 K/UL (ref 0–0.04)
IMM GRANULOCYTES NFR BLD AUTO: 0.3 % (ref 0–0.5)
LDLC SERPL CALC-MCNC: 91.2 MG/DL (ref 63–159)
LYMPHOCYTES # BLD AUTO: 3.2 K/UL (ref 1–4.8)
LYMPHOCYTES NFR BLD: 37.4 % (ref 18–48)
MCH RBC QN AUTO: 30.9 PG (ref 27–31)
MCHC RBC AUTO-ENTMCNC: 34.7 G/DL (ref 32–36)
MCV RBC AUTO: 89 FL (ref 82–98)
MONOCYTES # BLD AUTO: 0.9 K/UL (ref 0.3–1)
MONOCYTES NFR BLD: 9.9 % (ref 4–15)
NEUTROPHILS # BLD AUTO: 4.3 K/UL (ref 1.8–7.7)
NEUTROPHILS NFR BLD: 49.9 % (ref 38–73)
NONHDLC SERPL-MCNC: 167 MG/DL
NRBC BLD-RTO: 0 /100 WBC
PLATELET # BLD AUTO: 281 K/UL (ref 150–450)
PMV BLD AUTO: 11.6 FL (ref 9.2–12.9)
POTASSIUM SERPL-SCNC: 3.8 MMOL/L (ref 3.5–5.1)
PROT SERPL-MCNC: 8.1 G/DL (ref 6–8.4)
RBC # BLD AUTO: 5.56 M/UL (ref 4.6–6.2)
SODIUM SERPL-SCNC: 141 MMOL/L (ref 136–145)
TRIGL SERPL-MCNC: 379 MG/DL (ref 30–150)
WBC # BLD AUTO: 8.61 K/UL (ref 3.9–12.7)

## 2021-05-28 PROCEDURE — 3008F PR BODY MASS INDEX (BMI) DOCUMENTED: ICD-10-PCS | Mod: CPTII,S$GLB,, | Performed by: FAMILY MEDICINE

## 2021-05-28 PROCEDURE — 99214 OFFICE O/P EST MOD 30 MIN: CPT | Mod: S$GLB,,, | Performed by: FAMILY MEDICINE

## 2021-05-28 PROCEDURE — 1126F PR PAIN SEVERITY QUANTIFIED, NO PAIN PRESENT: ICD-10-PCS | Mod: S$GLB,,, | Performed by: FAMILY MEDICINE

## 2021-05-28 PROCEDURE — 3079F PR MOST RECENT DIASTOLIC BLOOD PRESSURE 80-89 MM HG: ICD-10-PCS | Mod: CPTII,S$GLB,, | Performed by: FAMILY MEDICINE

## 2021-05-28 PROCEDURE — 3079F DIAST BP 80-89 MM HG: CPT | Mod: CPTII,S$GLB,, | Performed by: FAMILY MEDICINE

## 2021-05-28 PROCEDURE — 3075F SYST BP GE 130 - 139MM HG: CPT | Mod: CPTII,S$GLB,, | Performed by: FAMILY MEDICINE

## 2021-05-28 PROCEDURE — 80061 LIPID PANEL: CPT | Performed by: FAMILY MEDICINE

## 2021-05-28 PROCEDURE — 99214 PR OFFICE/OUTPT VISIT, EST, LEVL IV, 30-39 MIN: ICD-10-PCS | Mod: S$GLB,,, | Performed by: FAMILY MEDICINE

## 2021-05-28 PROCEDURE — 83036 HEMOGLOBIN GLYCOSYLATED A1C: CPT | Performed by: FAMILY MEDICINE

## 2021-05-28 PROCEDURE — 3075F PR MOST RECENT SYSTOLIC BLOOD PRESS GE 130-139MM HG: ICD-10-PCS | Mod: CPTII,S$GLB,, | Performed by: FAMILY MEDICINE

## 2021-05-28 PROCEDURE — 1126F AMNT PAIN NOTED NONE PRSNT: CPT | Mod: S$GLB,,, | Performed by: FAMILY MEDICINE

## 2021-05-28 PROCEDURE — 3008F BODY MASS INDEX DOCD: CPT | Mod: CPTII,S$GLB,, | Performed by: FAMILY MEDICINE

## 2021-05-28 PROCEDURE — 36415 COLL VENOUS BLD VENIPUNCTURE: CPT | Mod: PO | Performed by: FAMILY MEDICINE

## 2021-05-28 PROCEDURE — 85025 COMPLETE CBC W/AUTO DIFF WBC: CPT | Performed by: FAMILY MEDICINE

## 2021-05-28 PROCEDURE — 99999 PR PBB SHADOW E&M-EST. PATIENT-LVL IV: CPT | Mod: PBBFAC,,, | Performed by: FAMILY MEDICINE

## 2021-05-28 PROCEDURE — 80053 COMPREHEN METABOLIC PANEL: CPT | Performed by: FAMILY MEDICINE

## 2021-05-28 PROCEDURE — 99999 PR PBB SHADOW E&M-EST. PATIENT-LVL IV: ICD-10-PCS | Mod: PBBFAC,,, | Performed by: FAMILY MEDICINE

## 2021-05-28 RX ORDER — LOSARTAN POTASSIUM AND HYDROCHLOROTHIAZIDE 12.5; 5 MG/1; MG/1
1 TABLET ORAL DAILY
Qty: 90 TABLET | Refills: 1 | Status: SHIPPED | OUTPATIENT
Start: 2021-05-28 | End: 2022-09-22

## 2021-05-29 LAB
ESTIMATED AVG GLUCOSE: 126 MG/DL (ref 68–131)
HBA1C MFR BLD: 6 % (ref 4–5.6)

## 2021-05-31 ENCOUNTER — PATIENT MESSAGE (OUTPATIENT)
Dept: GASTROENTEROLOGY | Facility: CLINIC | Age: 46
End: 2021-05-31

## 2021-06-03 ENCOUNTER — TELEPHONE (OUTPATIENT)
Dept: FAMILY MEDICINE | Facility: CLINIC | Age: 46
End: 2021-06-03

## 2021-06-07 ENCOUNTER — OFFICE VISIT (OUTPATIENT)
Dept: FAMILY MEDICINE | Facility: CLINIC | Age: 46
End: 2021-06-07
Payer: COMMERCIAL

## 2021-06-07 ENCOUNTER — LAB VISIT (OUTPATIENT)
Dept: LAB | Facility: HOSPITAL | Age: 46
End: 2021-06-07
Attending: FAMILY MEDICINE
Payer: COMMERCIAL

## 2021-06-07 VITALS
TEMPERATURE: 98 F | SYSTOLIC BLOOD PRESSURE: 118 MMHG | HEART RATE: 102 BPM | WEIGHT: 225.88 LBS | OXYGEN SATURATION: 98 % | DIASTOLIC BLOOD PRESSURE: 82 MMHG | BODY MASS INDEX: 30.6 KG/M2 | HEIGHT: 72 IN

## 2021-06-07 DIAGNOSIS — B18.2 CHRONIC HEPATITIS C WITHOUT HEPATIC COMA: ICD-10-CM

## 2021-06-07 DIAGNOSIS — R73.03 PREDIABETES: Primary | ICD-10-CM

## 2021-06-07 DIAGNOSIS — I10 ESSENTIAL HYPERTENSION: ICD-10-CM

## 2021-06-07 DIAGNOSIS — F17.200 SMOKER: ICD-10-CM

## 2021-06-07 DIAGNOSIS — R73.03 PREDIABETES: ICD-10-CM

## 2021-06-07 PROCEDURE — 99214 OFFICE O/P EST MOD 30 MIN: CPT | Mod: S$GLB,,, | Performed by: FAMILY MEDICINE

## 2021-06-07 PROCEDURE — 3008F BODY MASS INDEX DOCD: CPT | Mod: CPTII,S$GLB,, | Performed by: FAMILY MEDICINE

## 2021-06-07 PROCEDURE — 3008F PR BODY MASS INDEX (BMI) DOCUMENTED: ICD-10-PCS | Mod: CPTII,S$GLB,, | Performed by: FAMILY MEDICINE

## 2021-06-07 PROCEDURE — 99214 PR OFFICE/OUTPT VISIT, EST, LEVL IV, 30-39 MIN: ICD-10-PCS | Mod: S$GLB,,, | Performed by: FAMILY MEDICINE

## 2021-06-07 PROCEDURE — 99999 PR PBB SHADOW E&M-EST. PATIENT-LVL III: CPT | Mod: PBBFAC,,, | Performed by: FAMILY MEDICINE

## 2021-06-07 PROCEDURE — 80048 BASIC METABOLIC PNL TOTAL CA: CPT | Performed by: FAMILY MEDICINE

## 2021-06-07 PROCEDURE — 36415 COLL VENOUS BLD VENIPUNCTURE: CPT | Mod: PO | Performed by: FAMILY MEDICINE

## 2021-06-07 PROCEDURE — 1126F PR PAIN SEVERITY QUANTIFIED, NO PAIN PRESENT: ICD-10-PCS | Mod: S$GLB,,, | Performed by: FAMILY MEDICINE

## 2021-06-07 PROCEDURE — 99999 PR PBB SHADOW E&M-EST. PATIENT-LVL III: ICD-10-PCS | Mod: PBBFAC,,, | Performed by: FAMILY MEDICINE

## 2021-06-07 PROCEDURE — 1126F AMNT PAIN NOTED NONE PRSNT: CPT | Mod: S$GLB,,, | Performed by: FAMILY MEDICINE

## 2021-06-08 LAB
ANION GAP SERPL CALC-SCNC: 13 MMOL/L (ref 8–16)
BUN SERPL-MCNC: 14 MG/DL (ref 6–20)
CALCIUM SERPL-MCNC: 10.7 MG/DL (ref 8.7–10.5)
CHLORIDE SERPL-SCNC: 105 MMOL/L (ref 95–110)
CO2 SERPL-SCNC: 23 MMOL/L (ref 23–29)
CREAT SERPL-MCNC: 1.5 MG/DL (ref 0.5–1.4)
EST. GFR  (AFRICAN AMERICAN): >60 ML/MIN/1.73 M^2
EST. GFR  (NON AFRICAN AMERICAN): 54.7 ML/MIN/1.73 M^2
GLUCOSE SERPL-MCNC: 127 MG/DL (ref 70–110)
POTASSIUM SERPL-SCNC: 4 MMOL/L (ref 3.5–5.1)
SODIUM SERPL-SCNC: 141 MMOL/L (ref 136–145)

## 2021-06-10 ENCOUNTER — TELEPHONE (OUTPATIENT)
Dept: FAMILY MEDICINE | Facility: CLINIC | Age: 46
End: 2021-06-10

## 2021-06-10 DIAGNOSIS — R79.89 ELEVATED SERUM CREATININE: ICD-10-CM

## 2021-06-10 DIAGNOSIS — I10 ESSENTIAL HYPERTENSION: Primary | ICD-10-CM

## 2022-03-28 NOTE — LETTER
June 8, 2018      Alexsandra Howe MD  [address] [address] [address]           [facility] Gastroenterology  [address] [facility]  [place] [address]  Phone: [phone]  Fax: [phone]          Patient: Adonay Quiñonez II   MR Number: [id number]   YOB: 1975   Date of Visit: 6/8/2018       Dear Dr. Alexsandra Howe:    Thank you for referring Adonay Quiñonez II to me for evaluation. Attached you will find relevant portions of my assessment and plan of care.    If you have questions, please do not hesitate to call me. I look forward to following Adonay Quiñonez II along with you.    Sincerely,    Angélica Porras MD    Enclosure  CC:  No Recipients    If you would like to receive this communication electronically, please contact [email or web address] or [phone] to request more information on [employer] Link access.    For providers and/or their staff who would like to refer a patient to [facility], please contact us through our one-stop-shop provider referral line, [facility], at [phone].    If you feel you have received this communication in error or would no longer like to receive these types of communications, please e-mail [email or web address]         
Status post . Uncomplicated recovery. Follow up with Dr. Salinas.

## 2022-04-27 ENCOUNTER — PATIENT MESSAGE (OUTPATIENT)
Dept: ADMINISTRATIVE | Facility: HOSPITAL | Age: 47
End: 2022-04-27
Payer: MEDICAID

## 2022-07-27 ENCOUNTER — PATIENT MESSAGE (OUTPATIENT)
Dept: ADMINISTRATIVE | Facility: HOSPITAL | Age: 47
End: 2022-07-27
Payer: MEDICAID

## 2022-09-19 ENCOUNTER — TELEPHONE (OUTPATIENT)
Dept: FAMILY MEDICINE | Facility: CLINIC | Age: 47
End: 2022-09-19
Payer: MEDICAID

## 2022-09-19 NOTE — TELEPHONE ENCOUNTER
----- Message from Karina Chan sent at 9/19/2022 11:07 AM CDT -----  Contact: pt  Type:  Sooner Apoointment Request    Caller is requesting a sooner appointment.  Caller declined first available appointment listed below.  Caller will not accept being placed on the waitlist and is requesting a message be sent to doctor.  Name of Caller: pt   When is the first available appointment?  Symptoms: discuss med change on bp med/ sciatic nerve pain  Would the patient rather a call back or a response via [facility]? phone  Best Call Back Number: [phone]  Additional Information:  later this week per pt

## 2022-09-19 NOTE — TELEPHONE ENCOUNTER
Asking to be added into schedule later this week for sciatica and to change blood pressure medication. We have spots available but will be an override due to insurance

## 2022-09-22 ENCOUNTER — OFFICE VISIT (OUTPATIENT)
Dept: FAMILY MEDICINE | Facility: CLINIC | Age: 47
End: 2022-09-22
Payer: MEDICAID

## 2022-09-22 VITALS
TEMPERATURE: 98 F | HEIGHT: 72 IN | HEART RATE: 86 BPM | BODY MASS INDEX: 28.68 KG/M2 | WEIGHT: 211.75 LBS | SYSTOLIC BLOOD PRESSURE: 180 MMHG | DIASTOLIC BLOOD PRESSURE: 116 MMHG | OXYGEN SATURATION: 86 %

## 2022-09-22 DIAGNOSIS — I16.0 ASYMPTOMATIC HYPERTENSIVE URGENCY: ICD-10-CM

## 2022-09-22 DIAGNOSIS — M54.31 SCIATICA, RIGHT SIDE: Primary | ICD-10-CM

## 2022-09-22 PROCEDURE — 3080F PR MOST RECENT DIASTOLIC BLOOD PRESSURE >= 90 MM HG: ICD-10-PCS | Mod: CPTII,,, | Performed by: FAMILY MEDICINE

## 2022-09-22 PROCEDURE — 99214 OFFICE O/P EST MOD 30 MIN: CPT | Mod: S$PBB,,, | Performed by: FAMILY MEDICINE

## 2022-09-22 PROCEDURE — 3077F PR MOST RECENT SYSTOLIC BLOOD PRESSURE >= 140 MM HG: ICD-10-PCS | Mod: CPTII,,, | Performed by: FAMILY MEDICINE

## 2022-09-22 PROCEDURE — 3008F BODY MASS INDEX DOCD: CPT | Mod: CPTII,,, | Performed by: FAMILY MEDICINE

## 2022-09-22 PROCEDURE — 99999 PR PBB SHADOW E&M-EST. PATIENT-LVL III: ICD-10-PCS | Mod: PBBFAC,,, | Performed by: FAMILY MEDICINE

## 2022-09-22 PROCEDURE — 99214 PR OFFICE/OUTPT VISIT, EST, LEVL IV, 30-39 MIN: ICD-10-PCS | Mod: S$PBB,,, | Performed by: FAMILY MEDICINE

## 2022-09-22 PROCEDURE — 4010F ACE/ARB THERAPY RXD/TAKEN: CPT | Mod: CPTII,,, | Performed by: FAMILY MEDICINE

## 2022-09-22 PROCEDURE — 3008F PR BODY MASS INDEX (BMI) DOCUMENTED: ICD-10-PCS | Mod: CPTII,,, | Performed by: FAMILY MEDICINE

## 2022-09-22 PROCEDURE — 99213 OFFICE O/P EST LOW 20 MIN: CPT | Mod: PBBFAC,PO | Performed by: FAMILY MEDICINE

## 2022-09-22 PROCEDURE — 3080F DIAST BP >= 90 MM HG: CPT | Mod: CPTII,,, | Performed by: FAMILY MEDICINE

## 2022-09-22 PROCEDURE — 99999 PR PBB SHADOW E&M-EST. PATIENT-LVL III: CPT | Mod: PBBFAC,,, | Performed by: FAMILY MEDICINE

## 2022-09-22 PROCEDURE — 3077F SYST BP >= 140 MM HG: CPT | Mod: CPTII,,, | Performed by: FAMILY MEDICINE

## 2022-09-22 PROCEDURE — 4010F PR ACE/ARB THEARPY RXD/TAKEN: ICD-10-PCS | Mod: CPTII,,, | Performed by: FAMILY MEDICINE

## 2022-09-22 RX ORDER — MELOXICAM 7.5 MG/1
7.5 TABLET ORAL DAILY
Qty: 30 TABLET | Refills: 0 | Status: SHIPPED | OUTPATIENT
Start: 2022-09-22 | End: 2022-10-20 | Stop reason: SDUPTHER

## 2022-09-22 RX ORDER — VALSARTAN 160 MG/1
160 TABLET ORAL DAILY
Qty: 90 TABLET | Refills: 3 | Status: SHIPPED | OUTPATIENT
Start: 2022-09-22 | End: 2022-09-29
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
## 2022-09-27 DIAGNOSIS — I10 ESSENTIAL HYPERTENSION: ICD-10-CM

## 2022-09-28 ENCOUNTER — CLINICAL SUPPORT (OUTPATIENT)
Dept: REHABILITATION | Facility: HOSPITAL | Age: 47
End: 2022-09-28
Payer: MEDICAID

## 2022-09-28 DIAGNOSIS — M54.31 SCIATICA, RIGHT SIDE: ICD-10-CM

## 2022-09-28 DIAGNOSIS — M25.69 DECREASED RANGE OF MOTION OF TRUNK AND BACK: ICD-10-CM

## 2022-09-28 PROCEDURE — 97161 PT EVAL LOW COMPLEX 20 MIN: CPT

## 2022-09-28 PROCEDURE — 97110 THERAPEUTIC EXERCISES: CPT
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
## 2022-09-29 ENCOUNTER — OFFICE VISIT (OUTPATIENT)
Dept: FAMILY MEDICINE | Facility: CLINIC | Age: 47
End: 2022-09-29
Payer: MEDICAID

## 2022-09-29 VITALS
TEMPERATURE: 97 F | BODY MASS INDEX: 29.09 KG/M2 | HEART RATE: 93 BPM | DIASTOLIC BLOOD PRESSURE: 110 MMHG | SYSTOLIC BLOOD PRESSURE: 190 MMHG | HEIGHT: 72 IN | OXYGEN SATURATION: 98 % | WEIGHT: 214.75 LBS

## 2022-09-29 DIAGNOSIS — I16.0 ASYMPTOMATIC HYPERTENSIVE URGENCY: Primary | ICD-10-CM

## 2022-09-29 DIAGNOSIS — M54.31 SCIATICA, RIGHT SIDE: ICD-10-CM

## 2022-09-29 PROCEDURE — 4010F ACE/ARB THERAPY RXD/TAKEN: CPT | Mod: CPTII,,, | Performed by: FAMILY MEDICINE

## 2022-09-29 PROCEDURE — 4010F PR ACE/ARB THEARPY RXD/TAKEN: ICD-10-PCS | Mod: CPTII,,, | Performed by: FAMILY MEDICINE

## 2022-09-29 PROCEDURE — 99214 PR OFFICE/OUTPT VISIT, EST, LEVL IV, 30-39 MIN: ICD-10-PCS | Mod: S$PBB,,, | Performed by: FAMILY MEDICINE

## 2022-09-29 PROCEDURE — 3008F PR BODY MASS INDEX (BMI) DOCUMENTED: ICD-10-PCS | Mod: CPTII,,, | Performed by: FAMILY MEDICINE

## 2022-09-29 PROCEDURE — 99999 PR PBB SHADOW E&M-EST. PATIENT-LVL IV: ICD-10-PCS | Mod: PBBFAC,,, | Performed by: FAMILY MEDICINE

## 2022-09-29 PROCEDURE — 3077F SYST BP >= 140 MM HG: CPT | Mod: CPTII,,, | Performed by: FAMILY MEDICINE

## 2022-09-29 PROCEDURE — 99214 OFFICE O/P EST MOD 30 MIN: CPT | Mod: PBBFAC,PO | Performed by: FAMILY MEDICINE

## 2022-09-29 PROCEDURE — 99214 OFFICE O/P EST MOD 30 MIN: CPT | Mod: S$PBB,,, | Performed by: FAMILY MEDICINE

## 2022-09-29 PROCEDURE — 3008F BODY MASS INDEX DOCD: CPT | Mod: CPTII,,, | Performed by: FAMILY MEDICINE

## 2022-09-29 PROCEDURE — 3080F PR MOST RECENT DIASTOLIC BLOOD PRESSURE >= 90 MM HG: ICD-10-PCS | Mod: CPTII,,, | Performed by: FAMILY MEDICINE

## 2022-09-29 PROCEDURE — 3077F PR MOST RECENT SYSTOLIC BLOOD PRESSURE >= 140 MM HG: ICD-10-PCS | Mod: CPTII,,, | Performed by: FAMILY MEDICINE

## 2022-09-29 PROCEDURE — 99999 PR PBB SHADOW E&M-EST. PATIENT-LVL IV: CPT | Mod: PBBFAC,,, | Performed by: FAMILY MEDICINE

## 2022-09-29 PROCEDURE — 3080F DIAST BP >= 90 MM HG: CPT | Mod: CPTII,,, | Performed by: FAMILY MEDICINE

## 2022-09-29 RX ORDER — METHYLPREDNISOLONE 4 MG/1
TABLET ORAL
Qty: 1 EACH | Refills: 0 | Status: SHIPPED | OUTPATIENT
Start: 2022-09-29 | End: 2023-05-01

## 2022-09-29 RX ORDER — VALSARTAN AND HYDROCHLOROTHIAZIDE 320; 25 MG/1; MG/1
1 TABLET, FILM COATED ORAL DAILY
Qty: 30 TABLET | Refills: 11 | Status: SHIPPED | OUTPATIENT
Start: 2022-09-29 | End: 2023-05-01 | Stop reason: SDUPTHER

## 2022-09-29 RX ORDER — NIFEDIPINE 30 MG/1
30 TABLET, EXTENDED RELEASE ORAL NIGHTLY
Qty: 30 TABLET | Refills: 11 | Status: SHIPPED | OUTPATIENT
Start: 2022-09-29 | End: 2023-05-01 | Stop reason: SDUPTHER

## 2022-09-29 RX ORDER — TRAMADOL HYDROCHLORIDE 50 MG/1
50 TABLET ORAL EVERY 6 HOURS
Qty: 21 EACH | Refills: 0 | Status: SHIPPED | OUTPATIENT
Start: 2022-09-29 | End: 2022-10-20 | Stop reason: SDUPTHER
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
## 2022-10-03 ENCOUNTER — CLINICAL SUPPORT (OUTPATIENT)
Dept: REHABILITATION | Facility: HOSPITAL | Age: 47
End: 2022-10-03
Payer: MEDICAID

## 2022-10-03 ENCOUNTER — TELEPHONE (OUTPATIENT)
Dept: FAMILY MEDICINE | Facility: CLINIC | Age: 47
End: 2022-10-03
Payer: MEDICAID

## 2022-10-03 DIAGNOSIS — M25.69 DECREASED RANGE OF MOTION OF TRUNK AND BACK: Primary | ICD-10-CM

## 2022-10-03 PROCEDURE — 97110 THERAPEUTIC EXERCISES: CPT

## 2022-10-03 NOTE — TELEPHONE ENCOUNTER
Patient hasn't started procardia or diovan but will start today. Patient has a follow up appointment on Thursday 10/06/2023

## 2022-10-03 NOTE — TELEPHONE ENCOUNTER
blood pressure still high most systolic is about 180/118, has he started the Procardia along with the Diovan?  If yes please double up on that.  Continue monitor and follow-up in the clinic

## 2022-10-03 NOTE — PROGRESS NOTES
"  Physical Therapy Daily Treatment Note     Name: Adonay Quiñonez   Clinic Number: [id number]    Therapy Diagnosis:   Encounter Diagnosis   Name Primary?    Decreased range of motion of trunk and back Yes     Physician: Alexsandra Howe MD    Visit Date: 10/3/2022    Physician Orders: PT Eval and Treat  Medical Diagnosis from Referral: Sciatica, right side [M54.31]  Evaluation Date: 9/28/2022  Authorization Period Expiration: 9/22/2023  Plan of Care Expiration: 11/23/2022  Visit # / Visits authorized: 1/12 + eval   FOTO: 1/3     Precautions: Standard       Time In: 11:16 am  Time Out: 12:00 pm  Total Billable Time: 44 minutes    SUBJECTIVE     Today, pt reports: he was feeling much better yesterday so he played with his kids outside and now today he is hurting more.    He was compliant with home exercise program.  Response to previous treatment: positive. No soreness or adverse effects.   Functional change: n/a today    Pain: 5/10     Location: R low back and hip     OBJECTIVE / TREATMENT     Objective measures to be updated at Updated POC unless specified otherwise.      Adonay received the following manual therapy techniques applied for (00) minutes, including:      Adonay received therapeutic exercises to develop strength, endurance, ROM, flexibility, posture, and core stabilization for (45)  minutes including:    Shuttle 6c 3 minutes   Bridges + PPT 2 X 10  LTR X 15 B   SKTC 5 X 5" seconds B  Piriformis R 3 X 30"   R supine nerve glides X 12   Clamshells YTB x 20  B  Paloff press BTB x 20 B  Quadruped abdominal bracing + ppt  X 20 (focus on finding neutral spine)  NAPOLEON X 1 minute + alternating hamstring curls   Farmer's Carry 3 laps on turf #15    Adonay participated in dynamic functional therapeutic activities to improve functional performance for (0)  minutes, including:      Home Exercises Provided and Patient Education Provided     Education/Self-Care provided:  Patient educated on biomechanical justification for " therapeutic exercise and importance of compliance with HEP in order to improve overall impairments and QOL   Patient was educated on all the above exercise prior/during/after for proper posture, positioning, and execution for safe performance with home exercise program.     Home Exercises Provided: Patient instructed to cont prior HEP.  Exercises were reviewed and Adonay was able to demonstrate them prior to the end of the session.  Adonay demonstrated good  understanding of the education provided.     See EMR under Patient Instructions for exercises provided prior visit.    ASSESSMENT   Adonay tolerates full treatment session well today, denying any increase in pain or adverse effects by end of session. Pt was instructed through exercises to build up stabilizer strength and help pt find and sustain proper spinal alignment and core engagement. Pt requires frequent tactile and verbal cues to initially find and maintain neurtral spine throughout exercises. Pt appears motivated and responds well to cueing from PT. Will continue to progress per tolerance.     Adonay is progressing well towards his goals.   Pt prognosis is Good.     Pt will continue to benefit from skilled outpatient physical therapy to address the deficits listed in the problem list box on initial evaluation, provide pt/family education and to maximize pt's level of independence in the home and community environment.     Pt's spiritual, cultural and educational needs considered and pt agreeable to plan of care and goals.     Anticipated Barriers for therapy: co-morbidities, sedentary lifestyle, and chronicity of condition    GOALS:     Short Term Goals:  4 weeks Progress   9/28/2022   Pain: Pt will demonstrate improved pain by reports of less than or equal to 6/10 worst pain on the verbal rating scale in order to progress toward maximal functional ability and improve QOL. PC   Function: Patient will demonstrate improved function as indicated by a functional  limitation score of less than or equal to 39 out of 100 on FOTO. PC   HEP: Patient will demonstrate independence with HEP in order to progress toward functional independence. PC      Long Term Goals:  8 weeks Progress  9/28/2022   Pain: Pt will demonstrate improved pain by reports of less than or equal to 3/10 worst pain on the verbal rating scale in order to progress toward maximal functional ability and improve QOL.   PC   Function: Patient will demonstrate improved function as indicated by a functional limitation score of less than or equal to 30 out of 100 on FOTO. PC   Mobility: Patient will improve AROM to stated goals, listed in objective measures above, in order to return to maximal functional potential and improve quality of life. PC   Strength: Patient will improve strength to stated goals, listed in objective measures above, in order to improve functional independence and quality of life. PC   Goals Key:  PC= progressing/continue; PM= partially met;        DC= discontinue    PLAN   Plan of care Certification: 9/28/2022 to 11/23/2022      Outpatient Physical Therapy 2 times weekly for 8 weeks to include any combination of the following interventions: virtual visits, dry needling, modalities, electrical stimulation (IFC, Pre-Mod, Attended with Functional Dry Needling), Cervical/Lumbar Traction, Gait Training, Manual Therapy, Neuromuscular Re-ed, Patient Education, Self Care, Therapeutic Activites, and Therapeutic Exercise     Continue Plan of Care (POC) and progress per patient tolerance. See treatment section for details on planned progressions next session.    Tiana Heaton, PT

## 2022-10-06 ENCOUNTER — OFFICE VISIT (OUTPATIENT)
Dept: FAMILY MEDICINE | Facility: CLINIC | Age: 47
End: 2022-10-06
Payer: MEDICAID

## 2022-10-06 ENCOUNTER — CLINICAL SUPPORT (OUTPATIENT)
Dept: REHABILITATION | Facility: HOSPITAL | Age: 47
End: 2022-10-06
Payer: MEDICAID

## 2022-10-06 ENCOUNTER — LAB VISIT (OUTPATIENT)
Dept: LAB | Facility: HOSPITAL | Age: 47
End: 2022-10-06
Attending: FAMILY MEDICINE
Payer: MEDICAID

## 2022-10-06 VITALS
OXYGEN SATURATION: 98 % | HEART RATE: 91 BPM | HEIGHT: 72 IN | DIASTOLIC BLOOD PRESSURE: 78 MMHG | BODY MASS INDEX: 29.41 KG/M2 | SYSTOLIC BLOOD PRESSURE: 114 MMHG | WEIGHT: 217.13 LBS | TEMPERATURE: 98 F

## 2022-10-06 DIAGNOSIS — B18.2 CHRONIC HEPATITIS C WITHOUT HEPATIC COMA: ICD-10-CM

## 2022-10-06 DIAGNOSIS — M25.69 DECREASED RANGE OF MOTION OF TRUNK AND BACK: Primary | ICD-10-CM

## 2022-10-06 DIAGNOSIS — I10 ESSENTIAL HYPERTENSION: Primary | ICD-10-CM

## 2022-10-06 DIAGNOSIS — Z12.11 COLON CANCER SCREENING: ICD-10-CM

## 2022-10-06 DIAGNOSIS — I10 ESSENTIAL HYPERTENSION: ICD-10-CM

## 2022-10-06 PROCEDURE — 36415 COLL VENOUS BLD VENIPUNCTURE: CPT | Mod: PO | Performed by: FAMILY MEDICINE

## 2022-10-06 PROCEDURE — 99214 PR OFFICE/OUTPT VISIT, EST, LEVL IV, 30-39 MIN: ICD-10-PCS | Mod: S$PBB,,, | Performed by: FAMILY MEDICINE

## 2022-10-06 PROCEDURE — 4010F ACE/ARB THERAPY RXD/TAKEN: CPT | Mod: CPTII,,, | Performed by: FAMILY MEDICINE

## 2022-10-06 PROCEDURE — 80048 BASIC METABOLIC PNL TOTAL CA: CPT | Performed by: FAMILY MEDICINE

## 2022-10-06 PROCEDURE — 3074F SYST BP LT 130 MM HG: CPT | Mod: CPTII,,, | Performed by: FAMILY MEDICINE

## 2022-10-06 PROCEDURE — 99999 PR PBB SHADOW E&M-EST. PATIENT-LVL IV: CPT | Mod: PBBFAC,,, | Performed by: FAMILY MEDICINE

## 2022-10-06 PROCEDURE — 1159F PR MEDICATION LIST DOCUMENTED IN MEDICAL RECORD: ICD-10-PCS | Mod: CPTII,,, | Performed by: FAMILY MEDICINE

## 2022-10-06 PROCEDURE — 1159F MED LIST DOCD IN RCRD: CPT | Mod: CPTII,,, | Performed by: FAMILY MEDICINE

## 2022-10-06 PROCEDURE — 3078F DIAST BP <80 MM HG: CPT | Mod: CPTII,,, | Performed by: FAMILY MEDICINE

## 2022-10-06 PROCEDURE — 4010F PR ACE/ARB THEARPY RXD/TAKEN: ICD-10-PCS | Mod: CPTII,,, | Performed by: FAMILY MEDICINE

## 2022-10-06 PROCEDURE — 99214 OFFICE O/P EST MOD 30 MIN: CPT | Mod: S$PBB,,, | Performed by: FAMILY MEDICINE

## 2022-10-06 PROCEDURE — 99214 OFFICE O/P EST MOD 30 MIN: CPT | Mod: PBBFAC,PO | Performed by: FAMILY MEDICINE

## 2022-10-06 PROCEDURE — 1160F PR REVIEW ALL MEDS BY PRESCRIBER/CLIN PHARMACIST DOCUMENTED: ICD-10-PCS | Mod: CPTII,,, | Performed by: FAMILY MEDICINE

## 2022-10-06 PROCEDURE — 3008F BODY MASS INDEX DOCD: CPT | Mod: CPTII,,, | Performed by: FAMILY MEDICINE

## 2022-10-06 PROCEDURE — 3078F PR MOST RECENT DIASTOLIC BLOOD PRESSURE < 80 MM HG: ICD-10-PCS | Mod: CPTII,,, | Performed by: FAMILY MEDICINE

## 2022-10-06 PROCEDURE — 97110 THERAPEUTIC EXERCISES: CPT | Mod: CQ

## 2022-10-06 PROCEDURE — 3008F PR BODY MASS INDEX (BMI) DOCUMENTED: ICD-10-PCS | Mod: CPTII,,, | Performed by: FAMILY MEDICINE

## 2022-10-06 PROCEDURE — 1160F RVW MEDS BY RX/DR IN RCRD: CPT | Mod: CPTII,,, | Performed by: FAMILY MEDICINE

## 2022-10-06 PROCEDURE — 99999 PR PBB SHADOW E&M-EST. PATIENT-LVL IV: ICD-10-PCS | Mod: PBBFAC,,, | Performed by: FAMILY MEDICINE

## 2022-10-06 PROCEDURE — 3074F PR MOST RECENT SYSTOLIC BLOOD PRESSURE < 130 MM HG: ICD-10-PCS | Mod: CPTII,,, | Performed by: FAMILY MEDICINE
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
## 2022-10-06 NOTE — PROGRESS NOTES
"  Physical Therapy Daily Treatment Note     Name: Adonay Figueroa [facility]  Clinic Number: [id number]    Therapy Diagnosis:   No diagnosis found.    Physician: Alexsandra Howe MD    Visit Date: 10/6/2022    Physician Orders: PT Eval and Treat  Medical Diagnosis from Referral: Sciatica, right side [M54.31]  Evaluation Date: 9/28/2022  Authorization Period Expiration: 9/22/2023  Plan of Care Expiration: 11/23/2022  Visit # / Visits authorized: 2/12 + eval   FOTO: 1/3     Precautions: Standard     PTA Visit: 1/5    Time In: 08:45 am  Time Out: 09:38 am  Total Billable Time: 53 minutes    SUBJECTIVE     Today, pt reports: feeling great yesterday. Pt was able to play with son in the park playing hide n seek and riding skateboard and scooter. Pt reports only reoccurrence of increased pain when attempting to ride scooter. Pt reports this morning having a soreness and mild burning sensation in R glute. Pt reports getting best rest when sleeping flat on his back. Pt reports noticing an increase in hip lateral hip pain in sidelying. Pt reports f/u with MD today and states if BP is good he may try to return back to work soon.    He was compliant with home exercise program.  Response to previous treatment: positive. No soreness or adverse effects.   Functional change: n/a today    Pain: 5/10     Location: R low back and hip     OBJECTIVE / TREATMENT     Objective measures to be updated at Updated POC unless specified otherwise.      Adonay received the following manual therapy techniques applied for (00) minutes, including:      Adonay received therapeutic exercises to develop strength, endurance, ROM, flexibility, posture, and core stabilization for (53) minutes including:    Bridges + PPT 2 X 10  Piriformis R 3 X 30"   R supine 90/90 nerve glides; 5" hold X 15   Clamshells YTB; 5" 2x12  B  Palloff press BTB; 10x10" B  NAPOLEON X 1 minute + alternating hamstring curls   Farmer's Carry 3 laps on turf #15  Prone Prop; 2'  Prone Press Up; " "2x8  SLS 3x30" ea  Hip Hinges with purple band around anterior hips; 2x10  Sandbag carries; 20# at hips, waist, and chest, 1 lap ea  Sled pushes with 20# 3 laps    Adonay participated in dynamic functional therapeutic activities to improve functional performance for (0)  minutes, including:      Home Exercises Provided and Patient Education Provided     Education/Self-Care provided:  Patient educated on biomechanical justification for therapeutic exercise and importance of compliance with HEP in order to improve overall impairments and QOL   Patient was educated on all the above exercise prior/during/after for proper posture, positioning, and execution for safe performance with home exercise program.     Home Exercises Provided: Patient instructed to cont prior HEP.  Exercises were reviewed and Adonay was able to demonstrate them prior to the end of the session.  Adonay demonstrated good  understanding of the education provided.     See EMR under Patient Instructions for exercises provided prior visit.    ASSESSMENT     Continued work in attempt to regularly engage core mm throughout all exercises. Pt was able to tolerate addition of prone activities today with no c/o pain. Discussed with pt about attempting to keep a pillow between knees when sidelying in attempt to decrease subjective complaints of irritation. Also discussed with pt about regular water intake to help hydrate muscles especially when returning to work. Pt reports no reoccurrence of burning pain hip or LE during treatment.     Adonay is progressing well towards his goals.   Pt prognosis is Good.     Pt will continue to benefit from skilled outpatient physical therapy to address the deficits listed in the problem list box on initial evaluation, provide pt/family education and to maximize pt's level of independence in the home and community environment.     Pt's spiritual, cultural and educational needs considered and pt agreeable to plan of care and " goals.     Anticipated Barriers for therapy: co-morbidities, sedentary lifestyle, and chronicity of condition    GOALS:     Short Term Goals:  4 weeks Progress   9/28/2022   Pain: Pt will demonstrate improved pain by reports of less than or equal to 6/10 worst pain on the verbal rating scale in order to progress toward maximal functional ability and improve QOL. PC   Function: Patient will demonstrate improved function as indicated by a functional limitation score of less than or equal to 39 out of 100 on FOTO. PC   HEP: Patient will demonstrate independence with HEP in order to progress toward functional independence. PC      Long Term Goals:  8 weeks Progress  9/28/2022   Pain: Pt will demonstrate improved pain by reports of less than or equal to 3/10 worst pain on the verbal rating scale in order to progress toward maximal functional ability and improve QOL.   PC   Function: Patient will demonstrate improved function as indicated by a functional limitation score of less than or equal to 30 out of 100 on FOTO. PC   Mobility: Patient will improve AROM to stated goals, listed in objective measures above, in order to return to maximal functional potential and improve quality of life. PC   Strength: Patient will improve strength to stated goals, listed in objective measures above, in order to improve functional independence and quality of life. PC   Goals Key:  PC= progressing/continue; PM= partially met;        DC= discontinue    PLAN   Plan of care Certification: 9/28/2022 to 11/23/2022      Outpatient Physical Therapy 2 times weekly for 8 weeks to include any combination of the following interventions: virtual visits, dry needling, modalities, electrical stimulation (IFC, Pre-Mod, Attended with Functional Dry Needling), Cervical/Lumbar Traction, Gait Training, Manual Therapy, Neuromuscular Re-ed, Patient Education, Self Care, Therapeutic Activites, and Therapeutic Exercise     Continue Plan of Care (POC) and  progress per patient tolerance. See treatment section for details on planned progressions next session.    Haroon Michelle, PTA

## 2022-10-07 LAB
ANION GAP SERPL CALC-SCNC: 12 MMOL/L (ref 8–16)
BUN SERPL-MCNC: 23 MG/DL (ref 6–20)
CALCIUM SERPL-MCNC: 9.8 MG/DL (ref 8.7–10.5)
CHLORIDE SERPL-SCNC: 105 MMOL/L (ref 95–110)
CO2 SERPL-SCNC: 23 MMOL/L (ref 23–29)
CREAT SERPL-MCNC: 1 MG/DL (ref 0.5–1.4)
EST. GFR  (NO RACE VARIABLE): >60 ML/MIN/1.73 M^2
GLUCOSE SERPL-MCNC: 125 MG/DL (ref 70–110)
POTASSIUM SERPL-SCNC: 3.9 MMOL/L (ref 3.5–5.1)
SODIUM SERPL-SCNC: 140 MMOL/L (ref 136–145)

## 2022-10-14 ENCOUNTER — CLINICAL SUPPORT (OUTPATIENT)
Dept: REHABILITATION | Facility: HOSPITAL | Age: 47
End: 2022-10-14
Payer: MEDICAID

## 2022-10-14 ENCOUNTER — TELEPHONE (OUTPATIENT)
Dept: FAMILY MEDICINE | Facility: CLINIC | Age: 47
End: 2022-10-14
Payer: MEDICAID

## 2022-10-14 DIAGNOSIS — M25.69 DECREASED RANGE OF MOTION OF TRUNK AND BACK: Primary | ICD-10-CM

## 2022-10-14 PROCEDURE — 97110 THERAPEUTIC EXERCISES: CPT

## 2022-10-14 NOTE — TELEPHONE ENCOUNTER
----- Message from Alexsandra Howe MD sent at 10/9/2022  7:59 PM CDT -----  Renal function is improving

## 2022-10-14 NOTE — PROGRESS NOTES
"  Physical Therapy Daily Treatment Note     Name: Adonay Quiñonez II  Clinic Number: [id number]    Therapy Diagnosis:   Encounter Diagnosis   Name Primary?    Decreased range of motion of trunk and back Yes       Physician: Alexsandra Howe MD    Visit Date: 10/14/2022    Physician Orders: PT Eval and Treat  Medical Diagnosis from Referral: Sciatica, right side [M54.31]  Evaluation Date: 9/28/2022  Authorization Period Expiration: 9/22/2023  Plan of Care Expiration: 11/23/2022  Visit # / Visits authorized: 2/12 + eval   FOTO: 1/3     Precautions: Standard     PTA Visit: 1/5    Time In: 9:00 am  Time Out: 9:45 am  Total Billable Time: 45 minutes    SUBJECTIVE     Today, pt reports: feeling okay today. Has sciatic pain mainly while sitting. Laying on his back and standing helps relieve the pain.    He was compliant with home exercise program.  Response to previous treatment: positive. No soreness or adverse effects.   Functional change: n/a today    Pain: 5/10   - not asked today  Location: R low back and hip     OBJECTIVE / TREATMENT     Objective measures to be updated at Updated POC unless specified otherwise.      Adonay received the following manual therapy techniques applied for 15 minutes, including:    Piriformis release  Lumbar traction     Adonay received therapeutic exercises to develop strength, endurance, ROM, flexibility, posture, and core stabilization for 30 minutes including:    Bike 8 min for joint mobility   Bridges + PPT 3 X 10  Piriformis R 3 X 30"   R supine 90/90 nerve glides; 5" hold X 15   Clamshells RTB; 5" 2x10  B  Palloff press 30#; 2x10 B  Farmer's Carry 3 laps on turf #15  Prone Prop; 2'  Prone Press Up; 3x8  SLS 3x30" ea  Sandbag carries; 20# at hips, waist, and chest, 2 lap ea  Sled pushes with 20# 3 laps    Adonay participated in dynamic functional therapeutic activities to improve functional performance for ---  minutes, including:      Home Exercises Provided and Patient Education Provided "     Education/Self-Care provided:  Patient educated on biomechanical justification for therapeutic exercise and importance of compliance with HEP in order to improve overall impairments and QOL   Patient was educated on all the above exercise prior/during/after for proper posture, positioning, and execution for safe performance with home exercise program.     Home Exercises Provided: Patient instructed to cont prior HEP.  Exercises were reviewed and Adonay was able to demonstrate them prior to the end of the session.  Adonay demonstrated good  understanding of the education provided.     See EMR under Patient Instructions for exercises provided prior visit.    ASSESSMENT     Adonay tolerated all exercises performed today. Piriformis release and lumbar traction were performed to decrease sciatic nerve symptoms. Patient notes relief after manual therapy was presented. Therapy continues to focus on decreasing tension on the nerve and improving core stability. Continue progressing in POC as tolerated.     Adonay is progressing well towards his goals.   Pt prognosis is Good.     Pt will continue to benefit from skilled outpatient physical therapy to address the deficits listed in the problem list box on initial evaluation, provide pt/family education and to maximize pt's level of independence in the home and community environment.     Pt's spiritual, cultural and educational needs considered and pt agreeable to plan of care and goals.     Anticipated Barriers for therapy: co-morbidities, sedentary lifestyle, and chronicity of condition    GOALS:     Short Term Goals:  4 weeks Progress   9/28/2022   Pain: Pt will demonstrate improved pain by reports of less than or equal to 6/10 worst pain on the verbal rating scale in order to progress toward maximal functional ability and improve QOL. PC   Function: Patient will demonstrate improved function as indicated by a functional limitation score of less than or equal to 39 out of  100 on FOTO. PC   HEP: Patient will demonstrate independence with HEP in order to progress toward functional independence. PC      Long Term Goals:  8 weeks Progress  9/28/2022   Pain: Pt will demonstrate improved pain by reports of less than or equal to 3/10 worst pain on the verbal rating scale in order to progress toward maximal functional ability and improve QOL.   PC   Function: Patient will demonstrate improved function as indicated by a functional limitation score of less than or equal to 30 out of 100 on FOTO. PC   Mobility: Patient will improve AROM to stated goals, listed in objective measures above, in order to return to maximal functional potential and improve quality of life. PC   Strength: Patient will improve strength to stated goals, listed in objective measures above, in order to improve functional independence and quality of life. PC   Goals Key:  PC= progressing/continue; PM= partially met;        DC= discontinue    PLAN   Plan of care Certification: 9/28/2022 to 11/23/2022      Outpatient Physical Therapy 2 times weekly for 8 weeks to include any combination of the following interventions: virtual visits, dry needling, modalities, electrical stimulation (IFC, Pre-Mod, Attended with Functional Dry Needling), Cervical/Lumbar Traction, Gait Training, Manual Therapy, Neuromuscular Re-ed, Patient Education, Self Care, Therapeutic Activites, and Therapeutic Exercise     Continue Plan of Care (POC) and progress per patient tolerance. See treatment section for details on planned progressions next session.    Kayla Zimmer, PT

## 2022-10-21 ENCOUNTER — CLINICAL SUPPORT (OUTPATIENT)
Dept: REHABILITATION | Facility: HOSPITAL | Age: 47
End: 2022-10-21
Payer: MEDICAID

## 2022-10-21 DIAGNOSIS — M25.69 DECREASED RANGE OF MOTION OF TRUNK AND BACK: Primary | ICD-10-CM

## 2022-10-21 PROCEDURE — 97110 THERAPEUTIC EXERCISES: CPT | Mod: CQ

## 2022-10-21 NOTE — PROGRESS NOTES
"  Physical Therapist Assistant Daily Treatment Note     Name: Adonay Quiñonez   Clinic Number: [id number]    Therapy Diagnosis:   Encounter Diagnosis   Name Primary?    Decreased range of motion of trunk and back Yes       Physician: Alexsandra Howe MD    Visit Date: 10/21/2022    Physician Orders: PT Eval and Treat  Medical Diagnosis from Referral: Sciatica, right side [M54.31]  Evaluation Date: 9/28/2022  Authorization Period Expiration: 9/22/2023  Plan of Care Expiration: 11/23/2022  Visit # / Visits authorized: 4/16 + eval   FOTO: 1/3     Precautions: Standard     PTA Visit: 1/5    Time In: 11:15  Time Out: 12:00  Total Billable Time: 45 minutes    SUBJECTIVE     Today, pt reports: feeling okay today. Has sciatic pain mainly while sitting. Did not do his HEP this past week due to work schedule.  He was not compliant with home exercise program.  Response to previous treatment: positive. No soreness or adverse effects.   Functional change: n/a today    Pain: 8/10   Location: R low back down to upper posterior thigh    OBJECTIVE / TREATMENT     Objective measures to be updated at Updated POC unless specified otherwise.      Adonay received the following manual therapy techniques applied for 0 minutes, including:    Adonay received therapeutic exercises to develop strength, endurance, ROM, flexibility, posture, and core stabilization for 45 minutes including:  Seated R sciatic neural gliding 2 min  Piriformis B 3 X 30"   Supine R sciatic neural gliding 2 min  Side lying open books x10 B  Side lying clams RTB 2x10 B  Prone press ups 3x10  Quadruped cat/camel x10  Umesh pose stretch 3 breathes  Palloff press outs BTB 2x10 B  Sandbag carries; 20# at hips and then waist 2 lap ea  Sled pushes with 25# 2 laps  Standing R neural glides x10    Adonay participated in dynamic functional therapeutic activities to improve functional performance for ---  minutes, including:      Home Exercises Provided and Patient Education " Provided     Education/Self-Care provided:  Patient educated on biomechanical justification for therapeutic exercise and importance of compliance with HEP in order to improve overall impairments and QOL   Patient was educated on all the above exercise prior/during/after for proper posture, positioning, and execution for safe performance with home exercise program.     Home Exercises Provided: Patient instructed to cont prior HEP.  Exercises were reviewed and Adonay was able to demonstrate them prior to the end of the session.  Adonay demonstrated good  understanding of the education provided.     See EMR under Patient Instructions for exercises provided prior visit.    ASSESSMENT   Adonay presented to therapy today reporting significant back pain and radicular symptoms however feels frustrated as he did not work on his HEP this past week due to work . Today increased focus on his neural glides as his radicular symptoms appear to be slowly centralizing. Will proceed with continued functional strengthening and stabilization based on his neural tension and his tolerance. Adonay demonstrated a positive response to his session today as evidenced with his activity tolerance and reported decrease in pain from 8/10 to 6/10. Continued skilled intervention indicated to decrease neural tension and pain and improve his mobility w/stability to tolerate his daily and work activities.     Adonay is progressing well towards his goals.   Pt prognosis is Good.     Pt will continue to benefit from skilled outpatient physical therapy to address the deficits listed in the problem list box on initial evaluation, provide pt/family education and to maximize pt's level of independence in the home and community environment.     Pt's spiritual, cultural and educational needs considered and pt agreeable to plan of care and goals.     Anticipated Barriers for therapy: co-morbidities, sedentary lifestyle, chronicity of condition, work  schedule    GOALS:     Short Term Goals:  4 weeks Progress   9/28/2022   Pain: Pt will demonstrate improved pain by reports of less than or equal to 6/10 worst pain on the verbal rating scale in order to progress toward maximal functional ability and improve QOL. PC   Function: Patient will demonstrate improved function as indicated by a functional limitation score of less than or equal to 39 out of 100 on FOTO. PC   HEP: Patient will demonstrate independence with HEP in order to progress toward functional independence. PC      Long Term Goals:  8 weeks Progress  9/28/2022   Pain: Pt will demonstrate improved pain by reports of less than or equal to 3/10 worst pain on the verbal rating scale in order to progress toward maximal functional ability and improve QOL.   PC   Function: Patient will demonstrate improved function as indicated by a functional limitation score of less than or equal to 30 out of 100 on FOTO. PC   Mobility: Patient will improve AROM to stated goals, listed in objective measures above, in order to return to maximal functional potential and improve quality of life. PC   Strength: Patient will improve strength to stated goals, listed in objective measures above, in order to improve functional independence and quality of life. PC   Goals Key:  PC= progressing/continue; PM= partially met;        DC= discontinue    PLAN   Plan of care Certification: 9/28/2022 to 11/23/2022      Outpatient Physical Therapy 2 times weekly for 8 weeks to include any combination of the following interventions: virtual visits, dry needling, modalities, electrical stimulation (IFC, Pre-Mod, Attended with Functional Dry Needling), Cervical/Lumbar Traction, Gait Training, Manual Therapy, Neuromuscular Re-ed, Patient Education, Self Care, Therapeutic Activites, and Therapeutic Exercise     Continue Plan of Care (POC) and progress per patient tolerance. See treatment section for details on planned progressions next  session.    Melinda Lopes, PTA

## 2022-10-28 ENCOUNTER — CLINICAL SUPPORT (OUTPATIENT)
Dept: REHABILITATION | Facility: HOSPITAL | Age: 47
End: 2022-10-28
Payer: MEDICAID

## 2022-10-28 DIAGNOSIS — M25.69 DECREASED RANGE OF MOTION OF TRUNK AND BACK: Primary | ICD-10-CM

## 2022-10-28 PROCEDURE — 97110 THERAPEUTIC EXERCISES: CPT

## 2022-10-28 NOTE — PROGRESS NOTES
"  Physical Therapist Daily Treatment Note     Name: Adonay Quiñonez   Clinic Number: [id number]    Therapy Diagnosis:   Encounter Diagnosis   Name Primary?    Decreased range of motion of trunk and back Yes       Physician: Alexsandra Howe MD    Visit Date: 10/28/2022    Physician Orders: PT Eval and Treat  Medical Diagnosis from Referral: Sciatica, right side [M54.31]  Evaluation Date: 9/28/2022  Authorization Period Expiration: 9/22/2023  Plan of Care Expiration: 11/23/2022  Visit # / Visits authorized: 6/16 + eval   FOTO: 1/3     Precautions: Standard     PTA Visit: 1/5    Time In: 9:30am  Time Out: 10:15am  Total Billable Time: 45 minutes    SUBJECTIVE     Today, pt reports: the minutes he sits he has pain down the R leg, starting at the top of the hip down into the leg.   He was not compliant with home exercise program.  Response to previous treatment: positive. No soreness or adverse effects.   Functional change: n/a today    Pain: 8/10   Location: R low back down to upper posterior thigh    OBJECTIVE / TREATMENT     Objective measures to be updated at Updated POC unless specified otherwise.      Adonay received the following manual therapy techniques applied for 0 minutes, including:    Adonay received therapeutic exercises to develop strength, endurance, ROM, flexibility, posture, and core stabilization for 45 minutes including:  UBE 3/3 fwd/bwd for Range of motion and stretching  Sled pushes with 25# 2 laps  Farmer's Carry 20# 3 laps   Palloff press outs @ pulleys 70# 2 x 15  Table top hip extension 30x ea  Prone ham curls 30x (eccentric control)  Prone press ups 3x10  Seated R sciatic neural gliding 2 min  Supine R sciatic neural gliding 2 min  Supine figure 4 stretch 30" x 4  PPT 20x  PPT w/marching 20x  PPT w/alt leg ext 20x  Bridging 3" x 20  Side lying open books x10 B  Side lying clams RTB 2x10 B  Sidelying hip abd 20x ea      Deferred:   Quadruped cat/camel x10  Umesh pose stretch 3 breathes  Sandbag " carries; 20# at hips and then waist 2 lap ea  Standing R neural glides x10    Adonay participated in dynamic functional therapeutic activities to improve functional performance for ---  minutes, including:      Home Exercises Provided and Patient Education Provided     Education/Self-Care provided:  Patient educated on biomechanical justification for therapeutic exercise and importance of compliance with HEP in order to improve overall impairments and QOL   Patient was educated on all the above exercise prior/during/after for proper posture, positioning, and execution for safe performance with home exercise program.     Home Exercises Provided: Patient instructed to cont prior HEP.  Exercises were reviewed and Adonay was able to demonstrate them prior to the end of the session.  Adonay demonstrated good  understanding of the education provided.     See EMR under Patient Instructions for exercises provided prior visit.    ASSESSMENT   Adonay presented to therapy today with continued low back and radicular pain into the RLE.  He has continued neural tension of the sciatic nerve on the R side and continued tightness in the R piriformis.  He felt better after his treatment, educated patient on performing neural glides and piriformis stretching at work to limit his pain. Continued skilled intervention indicated to decrease neural tension and pain and improve his mobility w/stability to tolerate his daily and work activities.     Adonay is progressing well towards his goals.   Pt prognosis is Good.     Pt will continue to benefit from skilled outpatient physical therapy to address the deficits listed in the problem list box on initial evaluation, provide pt/family education and to maximize pt's level of independence in the home and community environment.     Pt's spiritual, cultural and educational needs considered and pt agreeable to plan of care and goals.     Anticipated Barriers for therapy: co-morbidities, sedentary  lifestyle, chronicity of condition, work schedule    GOALS:     Short Term Goals:  4 weeks Progress   9/28/2022   Pain: Pt will demonstrate improved pain by reports of less than or equal to 6/10 worst pain on the verbal rating scale in order to progress toward maximal functional ability and improve QOL. PC   Function: Patient will demonstrate improved function as indicated by a functional limitation score of less than or equal to 39 out of 100 on FOTO. PC   HEP: Patient will demonstrate independence with HEP in order to progress toward functional independence. PC      Long Term Goals:  8 weeks Progress  9/28/2022   Pain: Pt will demonstrate improved pain by reports of less than or equal to 3/10 worst pain on the verbal rating scale in order to progress toward maximal functional ability and improve QOL.   PC   Function: Patient will demonstrate improved function as indicated by a functional limitation score of less than or equal to 30 out of 100 on FOTO. PC   Mobility: Patient will improve AROM to stated goals, listed in objective measures above, in order to return to maximal functional potential and improve quality of life. PC   Strength: Patient will improve strength to stated goals, listed in objective measures above, in order to improve functional independence and quality of life. PC   Goals Key:  PC= progressing/continue; PM= partially met;        DC= discontinue    PLAN   Plan of care Certification: 9/28/2022 to 11/23/2022      Outpatient Physical Therapy 2 times weekly for 8 weeks to include any combination of the following interventions: virtual visits, dry needling, modalities, electrical stimulation (IFC, Pre-Mod, Attended with Functional Dry Needling), Cervical/Lumbar Traction, Gait Training, Manual Therapy, Neuromuscular Re-ed, Patient Education, Self Care, Therapeutic Activites, and Therapeutic Exercise     Continue Plan of Care (POC) and progress per patient tolerance. See treatment section for details  on planned progressions next session.    Kayla Zimmer, PT

## 2022-11-01 ENCOUNTER — TELEPHONE (OUTPATIENT)
Dept: PRIMARY CARE CLINIC | Facility: CLINIC | Age: 47
End: 2022-11-01
Payer: MEDICAID

## 2022-11-02 ENCOUNTER — PATIENT MESSAGE (OUTPATIENT)
Dept: HEPATOLOGY | Facility: CLINIC | Age: 47
End: 2022-11-02
Payer: MEDICAID

## 2022-11-11 ENCOUNTER — PATIENT MESSAGE (OUTPATIENT)
Dept: HEPATOLOGY | Facility: CLINIC | Age: 47
End: 2022-11-11
Payer: MEDICAID

## 2022-12-02 ENCOUNTER — PATIENT MESSAGE (OUTPATIENT)
Dept: HEPATOLOGY | Facility: CLINIC | Age: 47
End: 2022-12-02
Payer: MEDICAID

## 2022-12-29 ENCOUNTER — HOSPITAL ENCOUNTER (EMERGENCY)
Facility: HOSPITAL | Age: 47
Discharge: HOME | End: 2022-12-29
Attending: EMERGENCY MEDICINE
Payer: MEDICAID

## 2022-12-29 VITALS
RESPIRATION RATE: 21 BRPM | BODY MASS INDEX: 29.8 KG/M2 | DIASTOLIC BLOOD PRESSURE: 96 MMHG | HEART RATE: 77 BPM | SYSTOLIC BLOOD PRESSURE: 149 MMHG | WEIGHT: 220 LBS | OXYGEN SATURATION: 98 % | HEIGHT: 72 IN

## 2022-12-29 DIAGNOSIS — R10.13 EPIGASTRIC PAIN: ICD-10-CM

## 2022-12-29 DIAGNOSIS — N17.9 AKI (ACUTE KIDNEY INJURY): ICD-10-CM

## 2022-12-29 DIAGNOSIS — R52 PAIN: Primary | ICD-10-CM

## 2022-12-29 DIAGNOSIS — M62.82 NON-TRAUMATIC RHABDOMYOLYSIS: ICD-10-CM

## 2022-12-29 DIAGNOSIS — E86.0 DEHYDRATION: ICD-10-CM

## 2022-12-29 LAB
ALBUMIN SERPL BCP-MCNC: 5.6 G/DL (ref 3.5–5.2)
ALP SERPL-CCNC: 115 U/L (ref 38–126)
ALT SERPL W/O P-5'-P-CCNC: 95 U/L (ref 10–44)
AMPHET+METHAMPHET UR QL: ABNORMAL
ANION GAP SERPL CALC-SCNC: 13 MMOL/L (ref 8–16)
AST SERPL-CCNC: 58 U/L (ref 15–46)
BARBITURATES UR QL SCN>200 NG/ML: NEGATIVE
BASOPHILS # BLD AUTO: 0.08 K/UL (ref 0–0.2)
BASOPHILS NFR BLD: 0.7 % (ref 0–1.9)
BENZODIAZ UR QL SCN>200 NG/ML: NEGATIVE
BILIRUB SERPL-MCNC: 0.6 MG/DL (ref 0.1–1)
BILIRUB UR QL STRIP: NEGATIVE
BZE UR QL SCN: NEGATIVE
CALCIUM SERPL-MCNC: 10.3 MG/DL (ref 8.7–10.5)
CANNABINOIDS UR QL SCN: ABNORMAL
CHLORIDE SERPL-SCNC: 108 MMOL/L (ref 95–110)
CK SERPL-CCNC: 529 U/L (ref 55–170)
CLARITY UR REFRACT.AUTO: CLEAR
CO2 SERPL-SCNC: 22 MMOL/L (ref 23–29)
COLOR UR AUTO: YELLOW
CREAT SERPL-MCNC: 2.35 MG/DL (ref 0.5–1.4)
CREAT UR-MCNC: 146.9 MG/DL (ref 23–375)
DIFFERENTIAL METHOD: ABNORMAL
EOSINOPHIL # BLD AUTO: 0.1 K/UL (ref 0–0.5)
EOSINOPHIL NFR BLD: 0.7 % (ref 0–8)
ERYTHROCYTE [DISTWIDTH] IN BLOOD BY AUTOMATED COUNT: 12.4 % (ref 11.5–14.5)
EST. GFR  (NO RACE VARIABLE): 33.3 ML/MIN/1.73 M^2
GLUCOSE SERPL-MCNC: 148 MG/DL (ref 70–110)
GLUCOSE UR QL STRIP: NEGATIVE
HCT VFR BLD AUTO: 43.2 % (ref 40–54)
HGB BLD-MCNC: 15.3 G/DL (ref 14–18)
HGB UR QL STRIP: NEGATIVE
IMM GRANULOCYTES # BLD AUTO: 0.04 K/UL (ref 0–0.04)
IMM GRANULOCYTES NFR BLD AUTO: 0.3 % (ref 0–0.5)
KETONES UR QL STRIP: NEGATIVE
LEUKOCYTE ESTERASE UR QL STRIP: NEGATIVE
LIPASE SERPL-CCNC: 125 U/L (ref 23–300)
LYMPHOCYTES # BLD AUTO: 2.8 K/UL (ref 1–4.8)
LYMPHOCYTES NFR BLD: 23.1 % (ref 18–48)
MAGNESIUM SERPL-MCNC: 2 MG/DL (ref 1.6–2.6)
MCH RBC QN AUTO: 30.8 PG (ref 27–31)
MCHC RBC AUTO-ENTMCNC: 35.4 G/DL (ref 32–36)
MCV RBC AUTO: 87 FL (ref 82–98)
METHADONE UR QL SCN>300 NG/ML: NEGATIVE
MONOCYTES # BLD AUTO: 0.9 K/UL (ref 0.3–1)
MONOCYTES NFR BLD: 7.6 % (ref 4–15)
NEUTROPHILS # BLD AUTO: 8.2 K/UL (ref 1.8–7.7)
NEUTROPHILS NFR BLD: 67.6 % (ref 38–73)
NITRITE UR QL STRIP: NEGATIVE
NRBC BLD-RTO: 0 /100 WBC
NT-PROBNP SERPL-MCNC: 17 PG/ML (ref 5–450)
OPIATES UR QL SCN: NEGATIVE
PCP UR QL SCN>25 NG/ML: NEGATIVE
PH UR STRIP: 6 [PH] (ref 5–8)
PLATELET # BLD AUTO: 357 K/UL (ref 150–450)
PMV BLD AUTO: 10.5 FL (ref 9.2–12.9)
POTASSIUM SERPL-SCNC: 3.7 MMOL/L (ref 3.5–5.1)
PROT SERPL-MCNC: 8.5 G/DL (ref 6–8.4)
PROT UR QL STRIP: ABNORMAL
RBC # BLD AUTO: 4.97 M/UL (ref 4.6–6.2)
SODIUM SERPL-SCNC: 143 MMOL/L (ref 136–145)
SP GR UR STRIP: 1.02 (ref 1–1.03)
TOXICOLOGY INFORMATION: ABNORMAL
TROPONIN I SERPL-MCNC: <0.012 NG/ML (ref 0.01–0.03)
URN SPEC COLLECT METH UR: ABNORMAL
UROBILINOGEN UR STRIP-ACNC: NEGATIVE EU/DL
UUN UR-MCNC: 41 MG/DL (ref 2–20)
WBC # BLD AUTO: 12.14 K/UL (ref 3.9–12.7)

## 2022-12-29 PROCEDURE — 82550 ASSAY OF CK (CPK): CPT | Mod: ER | Performed by: EMERGENCY MEDICINE

## 2022-12-29 PROCEDURE — 80307 DRUG TEST PRSMV CHEM ANLYZR: CPT | Mod: ER | Performed by: EMERGENCY MEDICINE

## 2022-12-29 PROCEDURE — 99900035 HC TECH TIME PER 15 MIN (STAT): Mod: ER

## 2022-12-29 PROCEDURE — 80053 COMPREHEN METABOLIC PANEL: CPT | Mod: ER | Performed by: EMERGENCY MEDICINE

## 2022-12-29 PROCEDURE — 85025 COMPLETE CBC W/AUTO DIFF WBC: CPT | Mod: ER | Performed by: EMERGENCY MEDICINE

## 2022-12-29 PROCEDURE — 83880 ASSAY OF NATRIURETIC PEPTIDE: CPT | Mod: ER | Performed by: EMERGENCY MEDICINE

## 2022-12-29 PROCEDURE — 84484 ASSAY OF TROPONIN QUANT: CPT | Mod: ER | Performed by: EMERGENCY MEDICINE

## 2022-12-29 PROCEDURE — 99291 CRITICAL CARE FIRST HOUR: CPT | Mod: 25,ER

## 2022-12-29 PROCEDURE — 93005 ELECTROCARDIOGRAM TRACING: CPT | Mod: ER

## 2022-12-29 PROCEDURE — 96374 THER/PROPH/DIAG INJ IV PUSH: CPT | Mod: ER

## 2022-12-29 PROCEDURE — 94760 N-INVAS EAR/PLS OXIMETRY 1: CPT | Mod: ER

## 2022-12-29 PROCEDURE — 83735 ASSAY OF MAGNESIUM: CPT | Mod: ER | Performed by: EMERGENCY MEDICINE

## 2022-12-29 PROCEDURE — 96361 HYDRATE IV INFUSION ADD-ON: CPT | Mod: ER

## 2022-12-29 PROCEDURE — 93010 ELECTROCARDIOGRAM REPORT: CPT | Mod: ,,, | Performed by: INTERNAL MEDICINE

## 2022-12-29 PROCEDURE — 81003 URINALYSIS AUTO W/O SCOPE: CPT | Mod: ER,59 | Performed by: EMERGENCY MEDICINE

## 2022-12-29 PROCEDURE — 25000003 PHARM REV CODE 250: Mod: ER | Performed by: EMERGENCY MEDICINE

## 2022-12-29 PROCEDURE — 93010 EKG 12-LEAD: ICD-10-PCS | Mod: ,,, | Performed by: INTERNAL MEDICINE

## 2022-12-29 PROCEDURE — 83690 ASSAY OF LIPASE: CPT | Mod: ER | Performed by: EMERGENCY MEDICINE

## 2022-12-29 RX ORDER — FAMOTIDINE 10 MG/ML
20 INJECTION INTRAVENOUS 2 TIMES DAILY
Status: DISCONTINUED | OUTPATIENT
Start: 2022-12-29 | End: 2022-12-29 | Stop reason: HOSPADM

## 2022-12-29 RX ORDER — FAMOTIDINE 20 MG/1
20 TABLET, FILM COATED ORAL NIGHTLY PRN
Qty: 30 TABLET | Refills: 0 | Status: SHIPPED | OUTPATIENT
Start: 2022-12-29 | End: 2024-02-15

## 2022-12-29 RX ADMIN — SODIUM CHLORIDE 1000 ML: 0.9 INJECTION, SOLUTION INTRAVENOUS at 10:12

## 2022-12-29 RX ADMIN — FAMOTIDINE 20 MG: 10 INJECTION INTRAVENOUS at 10:12

## 2022-12-29 NOTE — DISCHARGE INSTRUCTIONS
Mr. Khang MEEK,    Thank you for letting me care for you today! It was nice meeting you, and I hope you feel better soon.   If you would like access to your chart and what was done today please utilize the [facility].   Please come back to [facility] for all of your future medical needs.    Our goal in the emergency department is to always give you outstanding care and exceptional service. You may receive a survey by mail or e-mail in the next week regarding your experience in our ED. We would greatly appreciate you completing and returning the survey. Your feedback provides us with a way to recognize our staff who give very good care and it helps us learn how to improve when your experience was below our aspiration of excellence.     Sincerely,    Eric Ridley MD  Board Certified Emergency Physician

## 2022-12-29 NOTE — Clinical Note
"Adonay Begum"Khang MEEK was seen and treated in our emergency department on 12/29/2022.  He may return to work on 12/30/2022.       If you have any questions or concerns, please don't hesitate to call.      Eric Ridley MD"

## 2022-12-29 NOTE — ED PROVIDER NOTES
Encounter Date: 2022       History     Chief Complaint   Patient presents with    Abdominal Pain     Pt reports stomach burning, pt states it feels like the worst pain of his life. Pt reports nausea, pt reports numbness to both arms. Pt states he feels nauseous as well.      47-year-old man who presents for evaluation of intense abdominal pain, nausea, diaphoresis which started while at work and eating a granola bar for breakfast.  He denies any recent illnesses, fevers, chills he became very concerned because after starting to feel unwell he had some numbness in the arms and was concerned he may have had a heart attack.  He can not recall the pain specifically like this in the past though he has had an episode of appendicitis mildly reminiscent of this pain.    Does note that he is a smoker, in addition marijuana user, meth user recreationally most recently used a day or so ago.  He denies any alcohol use recently.  Notes that the pain has abated somewhat by his arrival in the emergency department.    Review of patient's allergies indicates:  No Known Allergies  Past Medical History:   Diagnosis Date    Hypertension      Past Surgical History:   Procedure Laterality Date    APPENDECTOMY      CYST REMOVAL      hand     Family History   Problem Relation Age of Onset    Cirrhosis Father      Social History     Tobacco Use    Smoking status: Former     Packs/day: 1.00     Types: Cigarettes     Quit date: 2018     Years since quittin.6    Smokeless tobacco: Former     Types: Snuff     Quit date: 2018   Substance Use Topics    Alcohol use: Yes     Alcohol/week: 0.0 standard drinks     Comment: socially    Drug use: No     Review of Systems  Constitutional-no fever  HEENT-no congestion  Eyes-no redness  Respiratory-no shortness of breath  Cardio-no chest pain  GI-positive abdominal pain  Endocrine-no cold intolerance  -no difficulty urinating  MSK-no myalgias  Skin-no rashes  Allergy-no environmental  allergy  Neurologic-, no headache  Hematology-no swollen nodes  Behavioral-no confusion  Physical Exam     Initial Vitals [12/29/22 1028]   BP Pulse Resp Temp SpO2   (!) 141/92 83 18 -- 100 %      MAP       --         Physical Exam  Constitutional:  Uncomfortable appearing 47-year-old man in moderate distress  Eyes: Conjunctivae normal.  ENT       Head: Normocephalic, atraumatic.       Nose: Normal external appearance        Mouth/Throat: no strigulous respirations   Hematological/Lymphatic/Immunilogical: no visible lymphadenopathy   Cardiovascular: Normal rate,   Respiratory: Normal respiratory effort.   Gastrointestinal: non distended diffusely tender, no rebound, no guarding  Musculoskeletal: Normal range of motion in all extremities. No obvious deformities or swelling.  Neurologic: Alert, oriented. Normal speech and language. No gross focal neurologic deficits are appreciated.  Skin: Skin is warm, dry. No rash noted.  Psychiatric: Mood and affect are normal.   ED Course   Critical Care    Date/Time: 12/29/2022 12:46 PM  Performed by: Eric Ridley MD  Authorized by: Eric Ridley MD   Direct patient critical care time: 15 minutes  Additional history critical care time: 6 minutes  Ordering / reviewing critical care time: 5 minutes  Documentation critical care time: 5 minutes  Total critical care time (exclusive of procedural time) : 31 minutes  Critical care time was exclusive of separately billable procedures and treating other patients and teaching time.  Critical care was necessary to treat or prevent imminent or life-threatening deterioration of the following conditions: renal failure and toxidrome.  Critical care was time spent personally by me on the following activities: blood draw for specimens, development of treatment plan with patient or surrogate, interpretation of cardiac output measurements, evaluation of patient's response to treatment, examination of patient, obtaining history from  patient or surrogate, ordering and performing treatments and interventions, ordering and review of laboratory studies, ordering and review of radiographic studies, pulse oximetry, re-evaluation of patient's condition and review of old charts.      Labs Reviewed   CBC W/ AUTO DIFFERENTIAL - Abnormal; Notable for the following components:       Result Value    Gran # (ANC) 8.2 (*)     All other components within normal limits   COMPREHENSIVE METABOLIC PANEL - Abnormal; Notable for the following components:    CO2 22 (*)     Glucose 148 (*)     BUN 41 (*)     Creatinine 2.35 (*)     Total Protein 8.5 (*)     Albumin 5.6 (*)     AST 58 (*)     ALT 95 (*)     eGFR 33.3 (*)     All other components within normal limits   CK - Abnormal; Notable for the following components:     (*)     All other components within normal limits   URINALYSIS, REFLEX TO URINE CULTURE - Abnormal; Notable for the following components:    Protein, UA Trace (*)     All other components within normal limits    Narrative:     Preferred Collection Type->Urine, Clean Catch  Specimen Source->Urine   DRUG SCREEN PANEL, URINE EMERGENCY - Abnormal; Notable for the following components:    THC Presumptive Positive (*)     All other components within normal limits    Narrative:     Preferred Collection Type->Urine, Clean Catch  Specimen Source->Urine   NT-PRO NATRIURETIC PEPTIDE   LIPASE   MAGNESIUM   TROPONIN I        ECG Results              EKG 12-lead (Preliminary result)  Result time 12/29/22 10:35:14      ED Interpretation by Eric Ridley MD (12/29/22 10:35:14, [facility] - Emergency Dept, Emergency Medicine)    My EKG interpretation, sinus rhythm, 81 beats per minute, slight right axis deviation, no ST segment changes when compared to previous EKG 05/02/2018 relatively unchanged                                  Imaging Results              CT Abdomen Pelvis  Without Contrast (Final result)  Result time 12/29/22 11:53:29   Procedure  changed from CT Abdomen Pelvis With Contrast     Final result by SHIRLEY Babin Sr., MD (12/29/22 11:53:29)                   Impression:      1. There are nonobstructive stones in both kidneys. One of the larger ones measures 2 mm and is located in the inferior pole of the left kidney.  2. There is mild concentric bulging of the intervertebral discs between L4 and S1.  All CT scans at this facility use dose modulation, iterative reconstruction, and/or weight base dosing when appropriate to reduce radiation dose when appropriate to reduce radiation dose to as low as reasonably achievable.      Electronically signed by: Kemar Babin MD  Date:    12/29/2022  Time:    11:53               Narrative:    EXAMINATION:  CT ABDOMEN PELVIS WITHOUT CONTRAST    CLINICAL HISTORY:  Abdominal pain, acute, nonlocalized;    TECHNIQUE:  Standard abdomen and pelvis CT protocol without oral or IV contrast was performed.    COMPARISON:  04/12/2011    FINDINGS:  Finding: The size of the heart is within normal limits. The lungs are clear. There is no pneumothorax or pleural effusion.    The liver, gallbladder, pancreas, spleen, and adrenals are normal in appearance.  There are nonobstructive stones in both kidneys.  One of the larger ones measures 2 mm and is located in the inferior pole of the left kidney.  There is no hydronephrosis or abnormal perinephric fluid collection.  The ureters and the urinary bladder are normal in appearance.  There is a mild amount of calcification within the prostate.  The appendix is absent.  The rest of the gastrointestinal system is normal in appearance. There is no free fluid within the abdomen or pelvis. There is no pneumoperitoneum.  There is mild concentric bulging of the intervertebral discs between L4 and S1.                                       X-Ray Chest AP Portable (Final result)  Result time 12/29/22 10:55:52      Final result by SHIRLEY Babin Sr., MD (12/29/22 10:55:52)                    Impression:      Normal study.      Electronically signed by: Kemar Babin MD  Date:    12/29/2022  Time:    10:55               Narrative:    EXAMINATION:  XR CHEST AP PORTABLE    CLINICAL HISTORY:  Pain, unspecified    COMPARISON:  05/02/2018    FINDINGS:  The size of the heart is normal. The lungs are clear. There is no pneumothorax.  The costophrenic angles are sharp.                                       Medications   famotidine (PF) injection 20 mg (20 mg Intravenous Given 12/29/22 1052)   sodium chloride 0.9% bolus 1,000 mL 1,000 mL (has no administration in time range)   sodium chloride 0.9% bolus 1,000 mL 1,000 mL (0 mLs Intravenous Stopped 12/29/22 1343)     Medical Decision Making:   History:   Old Medical Records: I decided to obtain old medical records.  Old Records Summarized: records from clinic visits and records from previous admission(s).  Differential Diagnosis:   Abdominal pain represents a profoundly broad differential, this patient's symptoms are nondescript in nature and while unlikely could represent-  Pancreatitis, cholecystitis, appendicitis, gastritis, cystitis, pyleonephritis, PUD, obstructions constipation neoplasm among a myriad of other diagnoses.     A thorough examination and history was undertaken and appropriate diagnostics ordered with a diagnosis identified as below based on summative findings. It is possible this represents a more sinister underlying process and as such precautions related thereto were specifically discussed with the patient.   Clinical Tests:   Lab Tests: Ordered and Reviewed  Radiological Study: Ordered and Reviewed  Medical Tests: Ordered and Reviewed  ED Management:  Marked elevation in creatinine, on reassessment gentleman's improve greatly with the administration of Pepcid IV fluids.  Tolerating orals at this time, making urine readily.  Discussed expectant management, methamphetamine avoidance, the importance of close outpatient follow-up  returning case of worsening.                        Clinical Impression:   Final diagnoses:  [R52] Pain (Primary)  [R10.13] Epigastric pain  [N17.9] HERIBERTO (acute kidney injury)  [E86.0] Dehydration  [M62.82] Non-traumatic rhabdomyolysis        ED Disposition Condition    Discharge Stable          ED Prescriptions       Medication Sig Dispense Start Date End Date Auth. Provider    famotidine (PEPCID) 20 MG tablet Take 1 tablet (20 mg total) by mouth nightly as needed for Heartburn. 30 tablet 12/29/2022 1/13/2023 Eric Ridley MD          Follow-up Information       Follow up With Specialties Details Why Contact Info    Alexsandra Howe MD Family Medicine Call today If symptoms worsen, For a follow up visit about today [id number] [address] [address]  [phone]               Eric Ridley MD  12/29/22 6171

## 2023-04-06 ENCOUNTER — PATIENT MESSAGE (OUTPATIENT)
Dept: ADMINISTRATIVE | Facility: HOSPITAL | Age: 48
End: 2023-04-06
Payer: MEDICAID

## 2023-04-19 ENCOUNTER — PATIENT MESSAGE (OUTPATIENT)
Dept: ADMINISTRATIVE | Facility: HOSPITAL | Age: 48
End: 2023-04-19
Payer: MEDICAID

## 2023-04-20 DIAGNOSIS — Z12.11 SCREENING FOR COLON CANCER: ICD-10-CM

## 2023-05-01 ENCOUNTER — OFFICE VISIT (OUTPATIENT)
Dept: FAMILY MEDICINE | Facility: CLINIC | Age: 48
End: 2023-05-01
Payer: MEDICAID

## 2023-05-01 VITALS
BODY MASS INDEX: 29.21 KG/M2 | HEIGHT: 72 IN | TEMPERATURE: 98 F | RESPIRATION RATE: 18 BRPM | OXYGEN SATURATION: 96 % | DIASTOLIC BLOOD PRESSURE: 104 MMHG | HEART RATE: 97 BPM | WEIGHT: 215.63 LBS | SYSTOLIC BLOOD PRESSURE: 156 MMHG

## 2023-05-01 DIAGNOSIS — F12.10 MARIJUANA ABUSE, CONTINUOUS: ICD-10-CM

## 2023-05-01 DIAGNOSIS — F19.90 ILLICIT DRUG USE: ICD-10-CM

## 2023-05-01 DIAGNOSIS — B18.2 CHRONIC HEPATITIS C WITHOUT HEPATIC COMA: ICD-10-CM

## 2023-05-01 DIAGNOSIS — M18.11 ARTHRITIS OF CARPOMETACARPAL (CMC) JOINT OF RIGHT THUMB: Primary | ICD-10-CM

## 2023-05-01 DIAGNOSIS — I10 ESSENTIAL HYPERTENSION: ICD-10-CM

## 2023-05-01 DIAGNOSIS — Z12.11 COLON CANCER SCREENING: ICD-10-CM

## 2023-05-01 DIAGNOSIS — Z91.199 NON COMPLIANCE WITH MEDICAL TREATMENT: ICD-10-CM

## 2023-05-01 PROBLEM — F19.91 HISTORY OF ILLICIT DRUG USE: Status: RESOLVED | Noted: 2018-05-02 | Resolved: 2023-05-01

## 2023-05-01 PROCEDURE — 99999 PR PBB SHADOW E&M-EST. PATIENT-LVL V: ICD-10-PCS | Mod: PBBFAC,,, | Performed by: FAMILY MEDICINE

## 2023-05-01 PROCEDURE — 1159F MED LIST DOCD IN RCRD: CPT | Mod: CPTII,,, | Performed by: FAMILY MEDICINE

## 2023-05-01 PROCEDURE — 1160F PR REVIEW ALL MEDS BY PRESCRIBER/CLIN PHARMACIST DOCUMENTED: ICD-10-PCS | Mod: CPTII,,, | Performed by: FAMILY MEDICINE

## 2023-05-01 PROCEDURE — 3080F DIAST BP >= 90 MM HG: CPT | Mod: CPTII,,, | Performed by: FAMILY MEDICINE

## 2023-05-01 PROCEDURE — 3077F PR MOST RECENT SYSTOLIC BLOOD PRESSURE >= 140 MM HG: ICD-10-PCS | Mod: CPTII,,, | Performed by: FAMILY MEDICINE

## 2023-05-01 PROCEDURE — 3008F PR BODY MASS INDEX (BMI) DOCUMENTED: ICD-10-PCS | Mod: CPTII,,, | Performed by: FAMILY MEDICINE

## 2023-05-01 PROCEDURE — 3077F SYST BP >= 140 MM HG: CPT | Mod: CPTII,,, | Performed by: FAMILY MEDICINE

## 2023-05-01 PROCEDURE — 3008F BODY MASS INDEX DOCD: CPT | Mod: CPTII,,, | Performed by: FAMILY MEDICINE

## 2023-05-01 PROCEDURE — 3080F PR MOST RECENT DIASTOLIC BLOOD PRESSURE >= 90 MM HG: ICD-10-PCS | Mod: CPTII,,, | Performed by: FAMILY MEDICINE

## 2023-05-01 PROCEDURE — 1160F RVW MEDS BY RX/DR IN RCRD: CPT | Mod: CPTII,,, | Performed by: FAMILY MEDICINE

## 2023-05-01 PROCEDURE — 1159F PR MEDICATION LIST DOCUMENTED IN MEDICAL RECORD: ICD-10-PCS | Mod: CPTII,,, | Performed by: FAMILY MEDICINE

## 2023-05-01 PROCEDURE — 99215 OFFICE O/P EST HI 40 MIN: CPT | Mod: PBBFAC,PO | Performed by: FAMILY MEDICINE

## 2023-05-01 PROCEDURE — 99214 PR OFFICE/OUTPT VISIT, EST, LEVL IV, 30-39 MIN: ICD-10-PCS | Mod: S$PBB,,, | Performed by: FAMILY MEDICINE

## 2023-05-01 PROCEDURE — 99214 OFFICE O/P EST MOD 30 MIN: CPT | Mod: S$PBB,,, | Performed by: FAMILY MEDICINE

## 2023-05-01 PROCEDURE — 99999 PR PBB SHADOW E&M-EST. PATIENT-LVL V: CPT | Mod: PBBFAC,,, | Performed by: FAMILY MEDICINE

## 2023-05-01 RX ORDER — NIFEDIPINE 30 MG/1
30 TABLET, EXTENDED RELEASE ORAL NIGHTLY
Qty: 30 TABLET | Refills: 11 | Status: SHIPPED | OUTPATIENT
Start: 2023-05-01 | End: 2024-04-30

## 2023-05-01 RX ORDER — VALSARTAN AND HYDROCHLOROTHIAZIDE 320; 25 MG/1; MG/1
1 TABLET, FILM COATED ORAL DAILY
Qty: 30 TABLET | Refills: 11 | Status: SHIPPED | OUTPATIENT
Start: 2023-05-01 | End: 2024-04-30
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
## 2023-05-02 RX ORDER — VALSARTAN AND HYDROCHLOROTHIAZIDE 320; 25 MG/1; MG/1
TABLET, FILM COATED ORAL
Qty: 30 TABLET | Refills: 11 | OUTPATIENT
Start: 2023-05-02

## 2023-05-02 RX ORDER — NIFEDIPINE 30 MG/1
TABLET, EXTENDED RELEASE ORAL
Qty: 30 TABLET | Refills: 11 | OUTPATIENT
Start: 2023-05-02

## 2023-05-02 NOTE — TELEPHONE ENCOUNTER
No care due was identified.  [facility] Embedded Care Due Messages. Reference number: [id number].   5/02/2023 7:17:59 AM CDT

## 2023-05-02 NOTE — TELEPHONE ENCOUNTER
Refill Decision Note   Adonay Serratojoey MEEK  is requesting a refill authorization.  Brief Assessment and Rationale for Refill:  Quick Discontinue     Medication Therapy Plan:  E-Prescribing Status: Receipt confirmed by pharmacy (5/1/2023  4:55 PM CDT)/E-Prescribing Status: Receipt confirmed by pharmacy (5/1/2023  4:55 PM CDT)      Comments:     Note composed:7:37 AM 05/02/2023             Appointments     Last Visit   5/1/2023 Alexsandra Howe MD   Next Visit   Visit date not found Alexsandra Howe MD           Appointments     Last Visit   5/1/2023 Alexsandra Howe MD   Next Visit   Visit date not found Alexsandra Howe MD

## 2023-05-16 LAB — NONINV COLON CA DNA+OCC BLD SCRN STL QL: NEGATIVE

## 2023-05-23 ENCOUNTER — TELEPHONE (OUTPATIENT)
Dept: HEPATOLOGY | Facility: CLINIC | Age: 48
End: 2023-05-23
Payer: MEDICAID

## 2023-05-23 NOTE — TELEPHONE ENCOUNTER
Attempted to contact patient at [phone] with no answer. Unable to leave a voicemail message.     Patient is a new Medicaid internal referral. Patient can be sent to [facility] for scheduling.

## 2023-05-23 NOTE — TELEPHONE ENCOUNTER
----- Message from Quyen Bernal MA sent at 5/23/2023  2:13 PM CDT -----  Regarding: FW: Referral  Please call pt to schedule Hepatology referral.  ----- Message -----  From: Meggan He  Sent: 5/23/2023   1:49 PM CDT  To: [facility] Hepatology Clinical Support Staff  Subject: Referral                                           What is the request in detail: Please call pt to schedule Hepatology referral.Please advise.    Can the clinic reply by [facility]? No    Best Call Back Number: [phone]      Additional Information:

## 2023-06-12 ENCOUNTER — OFFICE VISIT (OUTPATIENT)
Dept: FAMILY MEDICINE | Facility: CLINIC | Age: 48
End: 2023-06-12
Payer: MEDICAID

## 2023-06-12 VITALS
SYSTOLIC BLOOD PRESSURE: 137 MMHG | OXYGEN SATURATION: 96 % | RESPIRATION RATE: 18 BRPM | DIASTOLIC BLOOD PRESSURE: 82 MMHG | HEART RATE: 81 BPM | BODY MASS INDEX: 29.64 KG/M2 | WEIGHT: 218.81 LBS | TEMPERATURE: 99 F | HEIGHT: 72 IN

## 2023-06-12 DIAGNOSIS — N52.9 ERECTILE DYSFUNCTION, UNSPECIFIED ERECTILE DYSFUNCTION TYPE: ICD-10-CM

## 2023-06-12 DIAGNOSIS — L25.9 CONTACT DERMATITIS, UNSPECIFIED CONTACT DERMATITIS TYPE, UNSPECIFIED TRIGGER: Primary | ICD-10-CM

## 2023-06-12 PROCEDURE — 3008F BODY MASS INDEX DOCD: CPT | Mod: CPTII,,, | Performed by: FAMILY MEDICINE

## 2023-06-12 PROCEDURE — 1159F MED LIST DOCD IN RCRD: CPT | Mod: CPTII,,, | Performed by: FAMILY MEDICINE

## 2023-06-12 PROCEDURE — 3075F SYST BP GE 130 - 139MM HG: CPT | Mod: CPTII,,, | Performed by: FAMILY MEDICINE

## 2023-06-12 PROCEDURE — 3079F DIAST BP 80-89 MM HG: CPT | Mod: CPTII,,, | Performed by: FAMILY MEDICINE

## 2023-06-12 PROCEDURE — 3008F PR BODY MASS INDEX (BMI) DOCUMENTED: ICD-10-PCS | Mod: CPTII,,, | Performed by: FAMILY MEDICINE

## 2023-06-12 PROCEDURE — 3079F PR MOST RECENT DIASTOLIC BLOOD PRESSURE 80-89 MM HG: ICD-10-PCS | Mod: CPTII,,, | Performed by: FAMILY MEDICINE

## 2023-06-12 PROCEDURE — 1160F RVW MEDS BY RX/DR IN RCRD: CPT | Mod: CPTII,,, | Performed by: FAMILY MEDICINE

## 2023-06-12 PROCEDURE — 99213 OFFICE O/P EST LOW 20 MIN: CPT | Mod: PBBFAC,PO | Performed by: FAMILY MEDICINE

## 2023-06-12 PROCEDURE — 99214 PR OFFICE/OUTPT VISIT, EST, LEVL IV, 30-39 MIN: ICD-10-PCS | Mod: S$PBB,,, | Performed by: FAMILY MEDICINE

## 2023-06-12 PROCEDURE — 3075F PR MOST RECENT SYSTOLIC BLOOD PRESS GE 130-139MM HG: ICD-10-PCS | Mod: CPTII,,, | Performed by: FAMILY MEDICINE

## 2023-06-12 PROCEDURE — 1159F PR MEDICATION LIST DOCUMENTED IN MEDICAL RECORD: ICD-10-PCS | Mod: CPTII,,, | Performed by: FAMILY MEDICINE

## 2023-06-12 PROCEDURE — 99999 PR PBB SHADOW E&M-EST. PATIENT-LVL III: ICD-10-PCS | Mod: PBBFAC,,, | Performed by: FAMILY MEDICINE

## 2023-06-12 PROCEDURE — 1160F PR REVIEW ALL MEDS BY PRESCRIBER/CLIN PHARMACIST DOCUMENTED: ICD-10-PCS | Mod: CPTII,,, | Performed by: FAMILY MEDICINE

## 2023-06-12 PROCEDURE — 99999 PR PBB SHADOW E&M-EST. PATIENT-LVL III: CPT | Mod: PBBFAC,,, | Performed by: FAMILY MEDICINE

## 2023-06-12 PROCEDURE — 99214 OFFICE O/P EST MOD 30 MIN: CPT | Mod: S$PBB,,, | Performed by: FAMILY MEDICINE

## 2023-06-12 RX ORDER — HYDROXYZINE HYDROCHLORIDE 10 MG/1
10 TABLET, FILM COATED ORAL 3 TIMES DAILY PRN
Qty: 30 TABLET | Refills: 1 | Status: SHIPPED | OUTPATIENT
Start: 2023-06-12 | End: 2024-02-15

## 2023-06-12 RX ORDER — METHYLPREDNISOLONE 4 MG/1
TABLET ORAL
Qty: 1 EACH | Refills: 0 | Status: SHIPPED | OUTPATIENT
Start: 2023-06-12 | End: 2024-02-15

## 2023-06-12 RX ORDER — SILDENAFIL 50 MG/1
50 TABLET, FILM COATED ORAL DAILY PRN
Qty: 30 TABLET | Refills: 1 | Status: SHIPPED | OUTPATIENT
Start: 2023-06-12 | End: 2024-06-11
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
## 2023-10-25 ENCOUNTER — OFFICE VISIT (OUTPATIENT)
Dept: FAMILY MEDICINE | Facility: CLINIC | Age: 48
End: 2023-10-25
Payer: MEDICAID

## 2023-10-25 VITALS
HEART RATE: 97 BPM | WEIGHT: 207.56 LBS | BODY MASS INDEX: 28.15 KG/M2 | DIASTOLIC BLOOD PRESSURE: 89 MMHG | OXYGEN SATURATION: 97 % | SYSTOLIC BLOOD PRESSURE: 139 MMHG

## 2023-10-25 DIAGNOSIS — M75.82 TENDINITIS OF LEFT ROTATOR CUFF: Primary | ICD-10-CM

## 2023-10-25 PROCEDURE — 99999PBSHW PR PBB SHADOW TECHNICAL ONLY FILED TO HB: Mod: PBBFAC,,,

## 2023-10-25 PROCEDURE — 1159F PR MEDICATION LIST DOCUMENTED IN MEDICAL RECORD: ICD-10-PCS | Mod: CPTII,,, | Performed by: FAMILY MEDICINE

## 2023-10-25 PROCEDURE — 99213 OFFICE O/P EST LOW 20 MIN: CPT | Mod: PBBFAC,PO | Performed by: FAMILY MEDICINE

## 2023-10-25 PROCEDURE — 99999PBSHW PR PBB SHADOW TECHNICAL ONLY FILED TO HB: ICD-10-PCS | Mod: PBBFAC,,,

## 2023-10-25 PROCEDURE — 99213 PR OFFICE/OUTPT VISIT, EST, LEVL III, 20-29 MIN: ICD-10-PCS | Mod: 25,S$PBB,, | Performed by: FAMILY MEDICINE

## 2023-10-25 PROCEDURE — 20551 NJX 1 TENDON ORIGIN/INSJ: CPT | Mod: S$PBB,,, | Performed by: FAMILY MEDICINE

## 2023-10-25 PROCEDURE — 3075F PR MOST RECENT SYSTOLIC BLOOD PRESS GE 130-139MM HG: ICD-10-PCS | Mod: CPTII,,, | Performed by: FAMILY MEDICINE

## 2023-10-25 PROCEDURE — 3008F PR BODY MASS INDEX (BMI) DOCUMENTED: ICD-10-PCS | Mod: CPTII,,, | Performed by: FAMILY MEDICINE

## 2023-10-25 PROCEDURE — 99213 OFFICE O/P EST LOW 20 MIN: CPT | Mod: 25,S$PBB,, | Performed by: FAMILY MEDICINE

## 2023-10-25 PROCEDURE — 3079F DIAST BP 80-89 MM HG: CPT | Mod: CPTII,,, | Performed by: FAMILY MEDICINE

## 2023-10-25 PROCEDURE — 99999 PR PBB SHADOW E&M-EST. PATIENT-LVL III: ICD-10-PCS | Mod: PBBFAC,,, | Performed by: FAMILY MEDICINE

## 2023-10-25 PROCEDURE — 3008F BODY MASS INDEX DOCD: CPT | Mod: CPTII,,, | Performed by: FAMILY MEDICINE

## 2023-10-25 PROCEDURE — 20551 PR INJECT TENDON ORIGIN/INSERT: ICD-10-PCS | Mod: S$PBB,,, | Performed by: FAMILY MEDICINE

## 2023-10-25 PROCEDURE — 3079F PR MOST RECENT DIASTOLIC BLOOD PRESSURE 80-89 MM HG: ICD-10-PCS | Mod: CPTII,,, | Performed by: FAMILY MEDICINE

## 2023-10-25 PROCEDURE — 99999 PR PBB SHADOW E&M-EST. PATIENT-LVL III: CPT | Mod: PBBFAC,,, | Performed by: FAMILY MEDICINE

## 2023-10-25 PROCEDURE — 20551 NJX 1 TENDON ORIGIN/INSJ: CPT | Mod: PBBFAC,PO | Performed by: FAMILY MEDICINE

## 2023-10-25 PROCEDURE — 1159F MED LIST DOCD IN RCRD: CPT | Mod: CPTII,,, | Performed by: FAMILY MEDICINE

## 2023-10-25 PROCEDURE — 3075F SYST BP GE 130 - 139MM HG: CPT | Mod: CPTII,,, | Performed by: FAMILY MEDICINE

## 2023-10-25 RX ORDER — METHYLPREDNISOLONE ACETATE 40 MG/ML
40 INJECTION, SUSPENSION INTRA-ARTICULAR; INTRALESIONAL; INTRAMUSCULAR; SOFT TISSUE
Status: COMPLETED | OUTPATIENT
Start: 2023-10-25 | End: 2023-10-25

## 2023-10-25 RX ORDER — MELOXICAM 7.5 MG/1
7.5 TABLET ORAL DAILY
Qty: 30 TABLET | Refills: 0 | Status: SHIPPED | OUTPATIENT
Start: 2023-10-25 | End: 2023-11-21

## 2023-10-25 RX ADMIN — METHYLPREDNISOLONE ACETATE 40 MG: 40 INJECTION, SUSPENSION INTRALESIONAL; INTRAMUSCULAR; INTRASYNOVIAL; SOFT TISSUE at 02:10

## 2023-10-25 NOTE — PROGRESS NOTES
Chief Complaint:    Chief Complaint   Patient presents with    Shoulder Pain     Left shoulder pain       History of Present Illness:    Patient presents today for L shoulder pain,    Originally hurt it a month and half ago. Says he sometimes forget it hurts and will do something strenuous re aggravating it three or four more times.    Does not check blood pressure home but thinks it is stable because he hasn't been feeling sxs of high blood pressure. He does say that he just took his medicine before driving here.     Would like to go to outside references for Hep C treatment and for cyst in hand.     ROS:  Review of Systems   Constitutional:  Negative for appetite change, chills and fever.   HENT:  Negative for congestion, ear pain, postnasal drip, rhinorrhea, sinus pressure and sinus pain.    Eyes:  Negative for pain.   Respiratory:  Negative for cough, chest tightness and shortness of breath.    Cardiovascular:  Negative for chest pain and palpitations.   Gastrointestinal:  Negative for abdominal pain, blood in stool, constipation, diarrhea and nausea.   Genitourinary:  Negative for difficulty urinating, dysuria, flank pain and hematuria.   Musculoskeletal:  Negative for arthralgias and myalgias.        (+) L shoulder pain   Skin:  Negative for pallor and wound.   Neurological:  Negative for dizziness, tremors, speech difficulty, light-headedness and headaches.   Psychiatric/Behavioral:  Negative for behavioral problems, dysphoric mood and sleep disturbance. The patient is not nervous/anxious.    All other systems reviewed and are negative.      Past Medical History:   Diagnosis Date    Hypertension        Social History:  Social History     Socioeconomic History    Marital status:    Tobacco Use    Smoking status: Former     Current packs/day: 0.00     Types: Cigarettes     Quit date: 2018     Years since quittin.4    Smokeless tobacco: Former     Types: Snuff     Quit date: 2018    Substance and Sexual Activity    Alcohol use: Yes     Alcohol/week: 0.0 standard drinks of alcohol     Comment: socially    Drug use: No    Sexual activity: Yes     Partners: Female     Birth control/protection: None       Family History:   family history includes Cirrhosis in his father.    Health Maintenance   Topic Date Due    Colorectal Cancer Screening  05/09/2026    Lipid Panel  05/28/2026    TETANUS VACCINE  05/02/2028    Hepatitis C Screening  Completed       Exam:Physical     Vital Signs  Pulse: 97  SpO2: 97 %  BP: (!) 140/98  BP Location: Left arm  Patient Position: Sitting  Pain Score:   3  Height and Weight  Weight: 94.2 kg (207 lb 9 oz)]    Body mass index is 28.15 kg/m².    Physical Exam  Constitutional:       Appearance: Normal appearance.   HENT:      Head: Normocephalic and atraumatic.      Right Ear: Tympanic membrane normal.      Left Ear: Tympanic membrane normal.   Eyes:      Extraocular Movements: Extraocular movements intact.      Pupils: Pupils are equal, round, and reactive to light.   Cardiovascular:      Rate and Rhythm: Normal rate and regular rhythm.      Pulses: Normal pulses.      Heart sounds: Normal heart sounds. No murmur heard.     No gallop.   Pulmonary:      Effort: Pulmonary effort is normal. No respiratory distress.      Breath sounds: Normal breath sounds. No wheezing, rhonchi or rales.   Abdominal:      General: There is no distension.      Palpations: Abdomen is soft.      Tenderness: There is no abdominal tenderness.   Musculoskeletal:         General: No swelling, deformity or signs of injury.      Left shoulder: Tenderness present. Decreased range of motion. Decreased strength.        Arms:       Cervical back: Normal range of motion.      Comments: Shoulder Impingement test positive Left.   Skin:     General: Skin is warm and dry.      Capillary Refill: Capillary refill takes less than 2 seconds.      Coloration: Skin is not jaundiced or pale.   Neurological:       General: No focal deficit present.      Mental Status: He is alert and oriented to person, place, and time.   Psychiatric:         Mood and Affect: Mood normal.         Behavior: Behavior normal.           Assessment:      ICD-10-CM ICD-9-CM   1. Tendinitis of left rotator cuff  M75.82 726.10         Plan:  Administered steroid injection to subacromial tendon. Massage area and apply ice after injection. Avoid heavy lifting.      Offered steroid injection, risk of steroid injection discussed with the patient which include but not limited to,  1.  Infection  2.  Bleeding  3.  Tendon disruption  4.  Elevation of blood sugar  5.  Fat atrophy  6.  Worsening pain and other unforeseen complication.  Treatment alternatives were also discussed, patient likes to proceed with the steroid injection.  Depo-Medrol 40 mg and lidocaine 2% without epi Cc was used for injection, and the area was identified prepped and injection done sterile condition, patient tolerated procedure well.     Do not lift anything more than 10 lbs for at least 2 weeks.   Start Meloxicam, take as needed with food.   Refer to physical therapy for Left rotator cuff tendonitis.      Continue monitoring blood pressure. Keep < 139/89. Check in a hour or two at home and message or call back with readings.      Orders Placed This Encounter   Procedures    Ambulatory referral/consult to Physical/Occupational Therapy          No follow-ups on file.      Alexsandra Howe MD    Scribe Attestation:   I, Johnathan Tate, am scribing for, and in the presence of, Dr.Arif Howe I performed the above scribed service and the documentation accurately describes the services I performed. I attest to the accuracy of the note.    I, Dr. Alexsandra Howe, reviewed documentation as scribed above. I performed the services described in this documentation.  I agree that the record reflects my personal performance and is accurate and complete. Alexsandra Howe MD.  10/25/2023

## 2023-10-31 ENCOUNTER — CLINICAL SUPPORT (OUTPATIENT)
Dept: REHABILITATION | Facility: HOSPITAL | Age: 48
End: 2023-10-31
Payer: MEDICAID

## 2023-10-31 DIAGNOSIS — M62.81 MUSCLE WEAKNESS OF LEFT ARM: ICD-10-CM

## 2023-10-31 DIAGNOSIS — M75.82 TENDINITIS OF LEFT ROTATOR CUFF: ICD-10-CM

## 2023-10-31 PROBLEM — M25.69 DECREASED RANGE OF MOTION OF TRUNK AND BACK: Status: RESOLVED | Noted: 2022-09-28 | Resolved: 2023-10-31

## 2023-10-31 PROCEDURE — 97110 THERAPEUTIC EXERCISES: CPT | Mod: PN

## 2023-10-31 PROCEDURE — 97112 NEUROMUSCULAR REEDUCATION: CPT | Mod: PN

## 2023-10-31 PROCEDURE — 97161 PT EVAL LOW COMPLEX 20 MIN: CPT | Mod: PN

## 2023-10-31 NOTE — PLAN OF CARE
[facility]   Physical Therapy Initial Evaluation     Date: 10/31/2023   Name: Adonay Quiñonez II  Clinic Number: [id number]    Therapy Diagnosis:   Encounter Diagnoses   Name Primary?    Tendinitis of left rotator cuff     Muscle weakness of left arm      Physician: Alexsandra Howe MD    Physician Orders: PT Eval and Treat  Medical Diagnosis from Referral: M75.82 (ICD-10-CM) - Tendinitis of left rotator cuff  Evaluation Date: 10/31/2023  Authorization Period Expiration: 10/24/24  Plan of Care Expiration: 12/12/23  Progress Note Due: Every 6th visit  Visit # / Visits authorized: 1/1   FOTO: 1/3    Precautions: Standard     Time In: 10:45 AM  Time Out: 11:35 AM  Total Appointment Time (timed & untimed codes): 50 minutes      SUBJECTIVE     Date of onset: About 2 months ago    History of current condition - Adonay reports: Pt was working in the yard when he went to lift something heavy and felt like his shoulder was ripping. Pt tried to rest the shoulder and notes no improvements. Pt got an injection in his shoulder and notes some improvements.    Pt reports burning pain to the lateral arm, worse with elevating. Pt is also left handed. Pt has difficulty playing with the kids and performing duties around the house.     Falls: 0    Imaging: No pertinent imaging found within the chart.     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      Medical History:   Past Medical History:   Diagnosis Date    Hypertension        Surgical History:   Adonay Quiñonez II  has a past surgical history that  includes Cyst Removal and Appendectomy.    Medications:   Adonay has a current medication list which includes the following prescription(s): famotidine, hydroxyzine hcl, meloxicam, methylprednisolone, nifedipine, sildenafil, and valsartan-hydrochlorothiazide.    Allergies:   Review of patient's allergies indicates:  No Known Allergies       OBJECTIVE     (NT = not tested due to pain and/or inability to obtain test position)    RANGE OF MOTION:    Cervical AROM grossly WNL without symptomatic change in the L shoulder.     Shoulder   Range of Motion Right  Active     Passive Left  Active     Passive Goal   Forward Flexion (180º) WNL - WNL* WNL* 170º   External Rotation at 90º (90º) WNL - WNL* WNL* 80º   Internal Rotation at 90º (90º) - - NT NT 70º   (*) pain and/or dysfunction  Pt reports significantly more pain with AROM compared to PROM    STRENGTH:    Upper Extremity  Strength RIGHT   Goal   Shoulder Flexion []1  []2  []3  []4  [x]5                []+ []- 5/5 B   Shoulder Abduction []1  []2  []3  []4  [x]5                []+ []- 5/5 B   Shoulder Internal Rotation  []1  []2  []3  []4  [x]5                []+ []- 5/5 B   Shoulder External Rotation []1  []2  []3  []4  [x]5                []+ []- 5/5 B     Upper Extremity  Strength LEFT   Goal   Shoulder Flexion []1  []2  []3  [x]4  []5                []+ [x]-* 5/5 B   Shoulder Abduction []1  []2  []3  [x]4  []5                []+ [x]-* 5/5 B   Shoulder Internal Rotation at 90/90  []1  []2  [x]3  []4  []5                [x]+ []- 5/5 B   Shoulder External Rotation at 90/90 []1  []2  [x]3  []4  []5                [x]+ []- 5/5 B     JOINT MOBILITY:     Joint Motion Tested Right  (spine) Left    Goal   GHJ Normal Normal Normal B     SPECIAL TESTS:    TEST Right  (spine)   Left    Goal   ER Lag at 90/90 Negative Negative* Negative B        Palpation: Increased tone and tenderness noted with palpation to: L infraspinatus and UT    Posture:  Pt presents with postural  "abnormalities which include: forward head and rounded shoulders    FUNCTION:     Intake Outcome Measure for FOTO Shoulder Survey    Therapist reviewed FOTO scores for Adonay Quiñonez II on 10/31/2023.   FOTO documents entered into [employer] - see Media section.    Intake Score: 39%           TREATMENT     Total Treatment time (time-based codes) separate from Evaluation: 30 minutes      Adonay received the treatments listed below:      therapeutic exercises to develop strength, endurance, ROM, and flexibility for 10 minutes including:  Table slides (20 x)  Patient education (see below)    manual therapy techniques: Joint mobilizations, Manual traction, and Soft tissue Mobilization were applied to the: L shoulder for 10 minutes, including:  PROM into ER and elevation    neuromuscular re-education activities to improve: Balance, Coordination, Kinesthetic, Sense, Proprioception, and Posture for 10 minutes. The following activities were included:  Isometrics (2 x 10, 10")   Flexion   ER   Abduction    therapeutic activities to improve functional performance for -  minutes, including:  -      PATIENT EDUCATION AND HOME EXERCISES     Patient Education:  Patient educated on the impairments noted above and the effects of physical therapy intervention to improve overall condition and QOL.   Patient was educated on all the above exercise prior/during/after for proper posture, positioning, and execution for safe performance with home exercise program.   Patient educated on postural awareness and improved ergonomics to decrease symptoms with ADL performance.  Patient educated on progressive POC to return to PLOF.       Written Home Exercises Provided: yes. Exercises were reviewed and Adonay was able to demonstrate them prior to the end of the session.  Adonay demonstrated good  understanding of the education provided. See EMR under Patient Instructions for exercises provided during therapy sessions.    ASSESSMENT     Adonay is a 48 " y.o. male referred to outpatient Physical Therapy with a medical diagnosis of M75.82 (ICD-10-CM) - Tendinitis of left rotator cuff. Patient presents with limitations to L shoulder AROM due to pain, decreased L UE strength with associated pain, and decreased tolerance to any weighted activity in the L UE. Pt's current symptom limit their ability to efficiently and independently complete ADLs. Pt to benefit from skilled PT to address aforementioned deficits and promote return to PLOF.     Patient prognosis is Good.   Patient will benefit from skilled outpatient Physical Therapy to address the deficits stated above and in the chart below, provide patient /family education, and to maximize patientt's level of independence.     Plan of care discussed with patient: Yes  Patient's spiritual, cultural and educational needs considered and patient is agreeable to the plan of care and goals as stated below:     Anticipated Barriers for therapy: -    Medical Necessity is demonstrated by the following  History  Co-morbidities and personal factors that may impact the plan of care [] LOW: no personal factors / co-morbidities  [x] MODERATE: 1-2 personal factors / co-morbidities  [] HIGH: 3+ personal factors / co-morbidities    Moderate / High Support Documentation:   Co-morbidities affecting plan of care: -    Personal Factors:   coping style     Examination  Body Structures and Functions, activity limitations and participation restrictions that may impact the plan of care [x] LOW: addressing 1-2 elements  [] MODERATE: 3+ elements  [] HIGH: 4+ elements (please support below)    Moderate / High Support Documentation: -     Clinical Presentation [x] LOW: stable  [] MODERATE: Evolving  [] HIGH: Unstable     Decision Making/ Complexity Score: low       GOALS:  SHORT TERM GOALS: 3 weeks Progress   Recent signs and systems trend is improving in order to progress towards Long term goals's.    Patient will be independent with Home Exercise  Program  in order to further progress and return to maximal function.    Pain rating at Worst: 5/10 in order to progress towards increased independence with activity.    Patient will be able to correct postural deviations in sitting and standing, to decrease pain and promote postural awareness for injury prevention.     Patient will partially meet predicted functional outcome (FOTO) score: 50% to improve towards increasing self-worth & perceived functional ability.      LONG TERM GOALS: 6 weeks, (12/12/23) Progress   Patient will return to normal Activities of daily living, recreational, and work related activities with less pain and limitation.     Patient will improve Range of Motion to stated goals in order to return to maximal functional potential.     Patient will improve Strength to stated goals of appropriate musculature in order to improve functional independence.     Pain Rating at Best: 1/10 to improve Quality of Life.     Patient will meet predicted functional outcome (FOTO) score: 65% to increase self-worth & perceived functional ability.    Patient will have met/partially met personal goal of:  Pt would like to get back to throwing a football.          PLAN   Plan of care Certification: 10/31/2023 to 12/12/23.    Outpatient Physical Therapy 2 times weekly for 6 weeks to include the following interventions: Cervical/Lumbar Traction, Electrical Stimulation , Gait Training, Manual Therapy, Moist Heat/ Ice, Neuromuscular Re-ed, Orthotic Management and Training, Patient Education, Self Care, Therapeutic Activities, Therapeutic Exercise, and FDN    Xochilt Whitaker, PT      I CERTIFY THE NEED FOR THESE SERVICES FURNISHED UNDER THIS PLAN OF TREATMENT AND WHILE UNDER MY CARE   Physician's comments:     Physician's Signature: ___________________________________________________

## 2023-11-07 NOTE — PROGRESS NOTES
"[facility]   Physical Therapy Treatment Note      Name: Adonay Quiñonez   Clinic Number: [id number]    Therapy Diagnosis:   Encounter Diagnoses   Name Primary?    Tendinitis of left rotator cuff Yes    Muscle weakness of left arm      Physician: Alexsandra Howe MD    Visit Date: 11/8/2023    Physician Orders: PT Eval and Treat  Medical Diagnosis from Referral: M75.82 (ICD-10-CM) - Tendinitis of left rotator cuff  Evaluation Date: 10/31/2023  Authorization Period Expiration: 10/24/24  Plan of Care Expiration: 12/12/23  Progress Note Due: Every 6th visit  Visit # / Visits authorized: 1/1   FOTO: 1/3     Precautions: Standard      Time In: 1:20 PM  Time Out: 2:00 PM  Total Appointment Time (timed & untimed codes): 40 minutes    PTA Visit #: 0/5       Subjective     Patient reports: Thought he pulled a muscle in his abdomen.  He was compliant with home exercise program.  Response to previous treatment: -  Functional change: -    Pain: 3/10  Location: L shoulder    Objective      Objective Measures updated at progress report unless specified.     Treatment     Adonay received the treatments listed below:      therapeutic exercises to develop strength, endurance, ROM, and flexibility for 10 minutes including:  UBE (3/3)  Wand flexion (3 x 10)  Sidelying abduction (3 x 10)  Wall slides (15 x)  Patient education (see below)     manual therapy techniques: Joint mobilizations, Manual traction, and Soft tissue Mobilization were applied to the: L shoulder for 10 minutes, including:  PROM into ER and elevation     neuromuscular re-education activities to improve: Balance, Coordination, Kinesthetic, Sense, Proprioception, and Posture for 20 minutes. The following activities were included:  Serratus punches (3 x 10, 10")  Prone row (3 x 12, 5 lbs)  Isometrics (10 x 10")              Flexion              ER              Abduction  IR/ER Walkout (2 x 10, RTB)     therapeutic activities to improve " functional performance for -  minutes, including:  -    Patient Education and Home Exercises       Education provided:   - Cont'd HEP    Written Home Exercises Provided: Patient instructed to cont prior HEP. Exercises were reviewed and Adonay was able to demonstrate them prior to the end of the session.  Adonay demonstrated good  understanding of the education provided. See Electronic Medical Record under Patient Instructions for exercises provided during therapy sessions    Assessment     Pt demonstrates independence c HEP and reports improved ease c performance. Pt states significant improvements since injection. Pt and PT discussed progression of treatment with improved symptoms.     Adonay Is progressing well towards his goals.   Patient prognosis is Good.     Patient will continue to benefit from skilled outpatient physical therapy to address the deficits listed in the problem list box on initial evaluation, provide pt/family education and to maximize pt's level of independence in the home and community environment.     Patient's spiritual, cultural and educational needs considered and pt agreeable to plan of care and goals.     Anticipated barriers to physical therapy: -    GOALS:  SHORT TERM GOALS: 3 weeks Progress   Recent signs and systems trend is improving in order to progress towards Long term goals's.  Progressing   Patient will be independent with Home Exercise Program  in order to further progress and return to maximal function. Progressing   Pain rating at Worst: 5/10 in order to progress towards increased independence with activity. Progressing   Patient will be able to correct postural deviations in sitting and standing, to decrease pain and promote postural awareness for injury prevention.  Progressing   Patient will partially meet predicted functional outcome (FOTO) score: 50% to improve towards increasing self-worth & perceived functional ability. Progressing      LONG TERM GOALS: 6 weeks,  (12/12/23) Progress   Patient will return to normal Activities of daily living, recreational, and work related activities with less pain and limitation.  Progressing   Patient will improve Range of Motion to stated goals in order to return to maximal functional potential.  Progressing   Patient will improve Strength to stated goals of appropriate musculature in order to improve functional independence.  Progressing   Pain Rating at Best: 1/10 to improve Quality of Life.  progressing   Patient will meet predicted functional outcome (FOTO) score: 65% to increase self-worth & perceived functional ability. Progressing   Patient will have met/partially met personal goal of:  Pt would like to get back to throwing a football.  Progressing       Plan     Plan of care Certification: 10/31/2023 to 12/12/23.     Outpatient Physical Therapy 2 times weekly for 6 weeks to include the following interventions: Cervical/Lumbar Traction, Electrical Stimulation , Gait Training, Manual Therapy, Moist Heat/ Ice, Neuromuscular Re-ed, Orthotic Management and Training, Patient Education, Self Care, Therapeutic Activities, Therapeutic Exercise, and FDN    Xochilt Whitaker, PT

## 2023-11-08 ENCOUNTER — CLINICAL SUPPORT (OUTPATIENT)
Dept: REHABILITATION | Facility: HOSPITAL | Age: 48
End: 2023-11-08
Payer: MEDICAID

## 2023-11-08 DIAGNOSIS — M62.81 MUSCLE WEAKNESS OF LEFT ARM: ICD-10-CM

## 2023-11-08 DIAGNOSIS — M75.82 TENDINITIS OF LEFT ROTATOR CUFF: Primary | ICD-10-CM

## 2023-11-08 PROCEDURE — 97112 NEUROMUSCULAR REEDUCATION: CPT | Mod: PN

## 2023-11-08 PROCEDURE — 97110 THERAPEUTIC EXERCISES: CPT | Mod: PN

## 2023-11-08 PROCEDURE — 97140 MANUAL THERAPY 1/> REGIONS: CPT | Mod: PN

## 2023-11-14 NOTE — PROGRESS NOTES
"[facility]   Physical Therapy Treatment Note      Name: Adonay Quiñonez   Clinic Number: [id number]    Therapy Diagnosis:   Encounter Diagnoses   Name Primary?    Tendinitis of left rotator cuff Yes    Muscle weakness of left arm        Physician: Alexsandra Howe MD    Visit Date: 11/15/2023    Physician Orders: PT Eval and Treat  Medical Diagnosis from Referral: M75.82 (ICD-10-CM) - Tendinitis of left rotator cuff  Evaluation Date: 10/31/2023  Authorization Period Expiration: 10/24/24  Plan of Care Expiration: 12/12/23  Progress Note Due: Every 6th visit  Visit # / Visits authorized: 1/12 + Eval  FOTO: 1/3     Precautions: Standard      Time In: 2:05 PM  Time Out: 2:50 PM  Total Appointment Time (timed & untimed codes): 45 minutes    PTA Visit #: 0/5       Subjective     Patient reports: Shoulder continues to feel better.   He was compliant with home exercise program.  Response to previous treatment: -  Functional change: -    Pain: 3/10  Location: L shoulder    Objective      Objective Measures updated at progress report unless specified.     Treatment     Adonay received the treatments listed below:      therapeutic exercises to develop strength, endurance, ROM, and flexibility for 10 minutes including:  UBE (3/3)  Wall slides (15 x)  Patient education (see below)     manual therapy techniques: Joint mobilizations, Manual traction, and Soft tissue Mobilization were applied to the: L shoulder for 10 minutes, including:  PROM into ER and elevation     neuromuscular re-education activities to improve: Balance, Coordination, Kinesthetic, Sense, Proprioception, and Posture for 25 minutes. The following activities were included:  Serratus punches (2 x 10, 10")  Prone row (3 x 10, 10 lbs)  Prone T (3 x 10, 3 lbs)  Prone Y (3 x 8, 1 lb)  IR (3 x 10, RTB)  ER (3 x 10, RTB)  Serratus wall slides (3 x 8, YTB)  Standing ABD iso (2 x 10, 10")  SL Abd (3 x 10, 4 lbs)  SL ER (3 x 8, 3 lb)   "   therapeutic activities to improve functional performance for -  minutes, including:  -    Patient Education and Home Exercises       Education provided:   - Cont'd HEP    Written Home Exercises Provided: Patient instructed to cont prior HEP. Exercises were reviewed and Adonay was able to demonstrate them prior to the end of the session.  Adonay demonstrated good  understanding of the education provided. See Electronic Medical Record under Patient Instructions for exercises provided during therapy sessions    Assessment     Pt continues to progress well in overall mobility and tolerance to re-education exercises. Pt requires minimal cuing to maintain desired mechanics. Pt to continue to benefit from skilled PT.    Adonay Is progressing well towards his goals.   Patient prognosis is Good.     Patient will continue to benefit from skilled outpatient physical therapy to address the deficits listed in the problem list box on initial evaluation, provide pt/family education and to maximize pt's level of independence in the home and community environment.     Patient's spiritual, cultural and educational needs considered and pt agreeable to plan of care and goals.     Anticipated barriers to physical therapy: -    GOALS:  SHORT TERM GOALS: 3 weeks Progress   Recent signs and systems trend is improving in order to progress towards Long term goals's.  Progressing   Patient will be independent with Home Exercise Program  in order to further progress and return to maximal function. Progressing   Pain rating at Worst: 5/10 in order to progress towards increased independence with activity. Progressing   Patient will be able to correct postural deviations in sitting and standing, to decrease pain and promote postural awareness for injury prevention.  Progressing   Patient will partially meet predicted functional outcome (FOTO) score: 50% to improve towards increasing self-worth & perceived functional ability. Progressing      LONG  TERM GOALS: 6 weeks, (12/12/23) Progress   Patient will return to normal Activities of daily living, recreational, and work related activities with less pain and limitation.  Progressing   Patient will improve Range of Motion to stated goals in order to return to maximal functional potential.  Progressing   Patient will improve Strength to stated goals of appropriate musculature in order to improve functional independence.  Progressing   Pain Rating at Best: 1/10 to improve Quality of Life.  progressing   Patient will meet predicted functional outcome (FOTO) score: 65% to increase self-worth & perceived functional ability. Progressing   Patient will have met/partially met personal goal of:  Pt would like to get back to throwing a football.  Progressing       Plan     Plan of care Certification: 10/31/2023 to 12/12/23.     Outpatient Physical Therapy 2 times weekly for 6 weeks to include the following interventions: Cervical/Lumbar Traction, Electrical Stimulation , Gait Training, Manual Therapy, Moist Heat/ Ice, Neuromuscular Re-ed, Orthotic Management and Training, Patient Education, Self Care, Therapeutic Activities, Therapeutic Exercise, and FDN    Xochilt Whitaker, PT

## 2023-11-15 ENCOUNTER — CLINICAL SUPPORT (OUTPATIENT)
Dept: REHABILITATION | Facility: HOSPITAL | Age: 48
End: 2023-11-15
Payer: MEDICAID

## 2023-11-15 DIAGNOSIS — M62.81 MUSCLE WEAKNESS OF LEFT ARM: ICD-10-CM

## 2023-11-15 DIAGNOSIS — M75.82 TENDINITIS OF LEFT ROTATOR CUFF: Primary | ICD-10-CM

## 2023-11-15 PROCEDURE — 97110 THERAPEUTIC EXERCISES: CPT | Mod: PN

## 2023-11-15 PROCEDURE — 97140 MANUAL THERAPY 1/> REGIONS: CPT | Mod: PN

## 2023-11-15 PROCEDURE — 97112 NEUROMUSCULAR REEDUCATION: CPT | Mod: PN

## 2023-11-21 NOTE — PROGRESS NOTES
"[facility]   Physical Therapy Treatment Note      Name: Adonay Quiñonez   Clinic Number: [id number]    Therapy Diagnosis:   Encounter Diagnoses   Name Primary?    Tendinitis of left rotator cuff Yes    Muscle weakness of left arm      Physician: Alexsandra Howe MD    Visit Date: 11/22/2023    Physician Orders: PT Eval and Treat  Medical Diagnosis from Referral: M75.82 (ICD-10-CM) - Tendinitis of left rotator cuff  Evaluation Date: 10/31/2023  Authorization Period Expiration: 10/24/24  Plan of Care Expiration: 12/12/23  Progress Note Due: Every 6th visit  Visit # / Visits authorized: 3/12 + Eval  FOTO: 1/3     Precautions: Standard      Time In: 2:00 PM  Time Out: 2:55 PM  Total Appointment Time (timed & untimed codes): 55 minutes    PTA Visit #: 0/5       Subjective     Patient reports: c/o increased shoulder pain/soreness after accidentally sleeping on his left side.   He was compliant with home exercise program.  Response to previous treatment: -  Functional change: -    Pain: 3/10  Location: L shoulder    Objective      Objective Measures updated at progress report unless specified.     Treatment     Adonay received the treatments listed below:      therapeutic exercises to develop strength, endurance, ROM, and flexibility for 15 minutes including:  UBE (3/3)  Wand flexion (3 x 10)  Wand ER - 90 and at side (2 x 10)  Shoulder circles (30 x forward and back)       manual therapy techniques: Joint mobilizations, Manual traction, and Soft tissue Mobilization were applied to the: L shoulder for - minutes, including:     neuromuscular re-education activities to improve: Balance, Coordination, Kinesthetic, Sense, Proprioception, and Posture for 40 minutes. The following activities were included:  Serratus punches (3 x 10, 10")  Prone row (3 x 12)  Prone T (3 x 10)  Prone shoulder extension (3 x 10)  IR (3 x 10, RTB)  ER (3 x 10, RTB)  Standing row (3 x 10, 20 lbs)     therapeutic activities " to improve functional performance for -  minutes, including:  -    Patient Education and Home Exercises       Education provided:   - Cont'd HEP    Written Home Exercises Provided: Patient instructed to cont prior HEP. Exercises were reviewed and Adonay was able to demonstrate them prior to the end of the session.  Adonay demonstrated good  understanding of the education provided. See Electronic Medical Record under Patient Instructions for exercises provided during therapy sessions    Assessment     Pt presents c increased pain and dysfunction today after accidentally sleeping on his L arm. PT educated patient on activities to avoid to decrease overall inflammatory response.     Adonay Is progressing well towards his goals.   Patient prognosis is Good.     Patient will continue to benefit from skilled outpatient physical therapy to address the deficits listed in the problem list box on initial evaluation, provide pt/family education and to maximize pt's level of independence in the home and community environment.     Patient's spiritual, cultural and educational needs considered and pt agreeable to plan of care and goals.     Anticipated barriers to physical therapy: -    GOALS:  SHORT TERM GOALS: 3 weeks Progress   Recent signs and systems trend is improving in order to progress towards Long term goals's.  Progressing   Patient will be independent with Home Exercise Program  in order to further progress and return to maximal function. Progressing   Pain rating at Worst: 5/10 in order to progress towards increased independence with activity. Progressing   Patient will be able to correct postural deviations in sitting and standing, to decrease pain and promote postural awareness for injury prevention.  Progressing   Patient will partially meet predicted functional outcome (FOTO) score: 50% to improve towards increasing self-worth & perceived functional ability. Progressing      LONG TERM GOALS: 6 weeks, (12/12/23)  Progress   Patient will return to normal Activities of daily living, recreational, and work related activities with less pain and limitation.  Progressing   Patient will improve Range of Motion to stated goals in order to return to maximal functional potential.  Progressing   Patient will improve Strength to stated goals of appropriate musculature in order to improve functional independence.  Progressing   Pain Rating at Best: 1/10 to improve Quality of Life.  progressing   Patient will meet predicted functional outcome (FOTO) score: 65% to increase self-worth & perceived functional ability. Progressing   Patient will have met/partially met personal goal of:  Pt would like to get back to throwing a football.  Progressing       Plan     Plan of care Certification: 10/31/2023 to 12/12/23.     Outpatient Physical Therapy 2 times weekly for 6 weeks to include the following interventions: Cervical/Lumbar Traction, Electrical Stimulation , Gait Training, Manual Therapy, Moist Heat/ Ice, Neuromuscular Re-ed, Orthotic Management and Training, Patient Education, Self Care, Therapeutic Activities, Therapeutic Exercise, and FDN    Xochilt Whitaker, PT

## 2023-11-22 ENCOUNTER — CLINICAL SUPPORT (OUTPATIENT)
Dept: REHABILITATION | Facility: HOSPITAL | Age: 48
End: 2023-11-22
Payer: MEDICAID

## 2023-11-22 DIAGNOSIS — M75.82 TENDINITIS OF LEFT ROTATOR CUFF: Primary | ICD-10-CM

## 2023-11-22 DIAGNOSIS — M62.81 MUSCLE WEAKNESS OF LEFT ARM: ICD-10-CM

## 2023-11-22 PROCEDURE — 97110 THERAPEUTIC EXERCISES: CPT | Mod: PN

## 2023-11-22 PROCEDURE — 97112 NEUROMUSCULAR REEDUCATION: CPT | Mod: PN

## 2024-02-15 ENCOUNTER — TELEPHONE (OUTPATIENT)
Dept: FAMILY MEDICINE | Facility: CLINIC | Age: 49
End: 2024-02-15
Payer: MEDICAID

## 2024-02-15 ENCOUNTER — OFFICE VISIT (OUTPATIENT)
Dept: FAMILY MEDICINE | Facility: CLINIC | Age: 49
End: 2024-02-15
Payer: MEDICAID

## 2024-02-15 VITALS
SYSTOLIC BLOOD PRESSURE: 122 MMHG | DIASTOLIC BLOOD PRESSURE: 79 MMHG | HEIGHT: 72 IN | WEIGHT: 222.25 LBS | HEART RATE: 93 BPM | BODY MASS INDEX: 30.1 KG/M2 | OXYGEN SATURATION: 97 %

## 2024-02-15 DIAGNOSIS — J01.00 ACUTE MAXILLARY SINUSITIS, RECURRENCE NOT SPECIFIED: Primary | ICD-10-CM

## 2024-02-15 PROCEDURE — 1159F MED LIST DOCD IN RCRD: CPT | Mod: CPTII,,, | Performed by: STUDENT IN AN ORGANIZED HEALTH CARE EDUCATION/TRAINING PROGRAM

## 2024-02-15 PROCEDURE — 3078F DIAST BP <80 MM HG: CPT | Mod: CPTII,,, | Performed by: STUDENT IN AN ORGANIZED HEALTH CARE EDUCATION/TRAINING PROGRAM

## 2024-02-15 PROCEDURE — 3074F SYST BP LT 130 MM HG: CPT | Mod: CPTII,,, | Performed by: STUDENT IN AN ORGANIZED HEALTH CARE EDUCATION/TRAINING PROGRAM

## 2024-02-15 PROCEDURE — 99213 OFFICE O/P EST LOW 20 MIN: CPT | Mod: S$PBB,,, | Performed by: STUDENT IN AN ORGANIZED HEALTH CARE EDUCATION/TRAINING PROGRAM

## 2024-02-15 PROCEDURE — 99999 PR PBB SHADOW E&M-EST. PATIENT-LVL III: CPT | Mod: PBBFAC,,, | Performed by: STUDENT IN AN ORGANIZED HEALTH CARE EDUCATION/TRAINING PROGRAM

## 2024-02-15 PROCEDURE — 99213 OFFICE O/P EST LOW 20 MIN: CPT | Mod: PBBFAC,PO | Performed by: STUDENT IN AN ORGANIZED HEALTH CARE EDUCATION/TRAINING PROGRAM

## 2024-02-15 PROCEDURE — 3008F BODY MASS INDEX DOCD: CPT | Mod: CPTII,,, | Performed by: STUDENT IN AN ORGANIZED HEALTH CARE EDUCATION/TRAINING PROGRAM

## 2024-02-15 RX ORDER — AMOXICILLIN AND CLAVULANATE POTASSIUM 875; 125 MG/1; MG/1
1 TABLET, FILM COATED ORAL EVERY 12 HOURS
Qty: 10 TABLET | Refills: 0 | Status: SHIPPED | OUTPATIENT
Start: 2024-02-15 | End: 2024-02-20

## 2024-02-15 NOTE — TELEPHONE ENCOUNTER
----- Message from Jeff Mathias sent at 2/15/2024 10:17 AM CST -----  Contact: Adonay  Type:  Same Day Appointment Request    Caller is requesting a same day appointment.  Caller declined first available appointment listed below.    Name of Caller:Adonay   When is the first available appointment?schedule wouldn't populate due to insurance   Symptoms:sinus infection,dizziness, high bp  Best Call Back Number:[phone]  Additional Information:     Thanks   Am

## 2024-02-15 NOTE — PROGRESS NOTES
Same day clinic    Adonay Quiñonez II is a 48 y.o. year old White male  has a past medical history of Hypertension.. Pt presented to the clinic for dizziness and sinusitis.  States that he had 1 episode of dizziness when he was going from 1 room to another and felt unsteady for few seconds.  He did not lose consciousness or did not have any falls.  He does have sinus congestion for the past 2 weeks which is worsening.  It is mainly in the right nostril and he is having green mucus discharge.  Pain is also referral to his right inner ear.  His blood pressure was slightly elevated when he has a dizzy episode 168/68 but then it was controlled.  Patient did smoke marijuana before he had the episode.       ROS: Negative except above      Vitals:    02/15/24 1652   BP: 122/79   BP Location: Right arm   Patient Position: Sitting   BP Method: Large (Manual)   Pulse: 93   SpO2: 97%   Weight: 100.8 kg (222 lb 3.6 oz)   Height: 6' (1.829 m)       Body mass index is 30.14 kg/m².     PE:  General - Well developed, alert and oriented in NAD  HEENT - normocephalic, no evidence of trauma, sclera white, EOMI, nasal congestion present, right sinus tenderness present   Neck - full range of motion  COR - regular rate and rhythm without murmurs or gallops  Lungs - Clear  Abdomen - soft, non-tender  Ext - no cyanosis or edema    Lab Results   Component Value Date    WBC 12.14 12/29/2022    HGB Negative 11/07/2023    HCT 43.2 12/29/2022    MCV 87 12/29/2022    MCH 30.8 12/29/2022    MCHC 35.4 12/29/2022    RDW 12.4 12/29/2022     12/29/2022    MPV 10.5 12/29/2022       Lab Results   Component Value Date     12/29/2022    K 3.7 12/29/2022     12/29/2022    CO2 22 (L) 12/29/2022     (H) 12/29/2022    BUN 41 (H) 12/29/2022    CALCIUM 10.3 12/29/2022    PROT 8.5 (H) 12/29/2022    ALBUMIN 5.6 (H) 12/29/2022    BILITOT 0.6 12/29/2022    AST 58 (H) 12/29/2022    ALKPHOS 115 12/29/2022    ALT 95 (H) 12/29/2022  "      Lab Results   Component Value Date    TRIG 379 (H) 05/28/2021    CHOL 195 05/28/2021    HDL 28 (L) 05/28/2021    LDLCALC 91.2 05/28/2021    CHOLHDL 14.4 (L) 05/28/2021           Lab Results   Component Value Date    HGBA1C 6.0 (H) 05/28/2021       No results found for: "MICROALBUR", "TRIM44OLY"      Impression:  1. Acute maxillary sinusitis, recurrence not specified  amoxicillin-clavulanate 875-125mg (AUGMENTIN) 875-125 mg per tablet           Plan:  Patient has acute bacterial sinusitis for which antibiotic prescription given.  One episode of vertigo  most likely due to labyrinthitis.  Notify if he continues to have dizziness.   No orders of the defined types were placed in this encounter.      No follow-ups on file.     Blossom Vargas MD           "

## 2024-02-15 NOTE — TELEPHONE ENCOUNTER
----- Message from Jeff Mathias sent at 2/15/2024 10:17 AM CST -----  Contact: Adonay  Type:  Same Day Appointment Request    Caller is requesting a same day appointment.  Caller declined first available appointment listed below.    Name of Caller:Adonay   When is the first available appointment?schedule wouldn't populate due to insurance   Symptoms:sinus infection,dizziness, high bp  Best Call Back Number:[phone]  Additional Information:     Thanks   Am

## 2024-05-13 RX ORDER — NIFEDIPINE 30 MG/1
30 TABLET, EXTENDED RELEASE ORAL NIGHTLY
Qty: 90 TABLET | Refills: 1 | Status: SHIPPED | OUTPATIENT
Start: 2024-05-13

## 2024-05-13 RX ORDER — VALSARTAN AND HYDROCHLOROTHIAZIDE 320; 25 MG/1; MG/1
1 TABLET, FILM COATED ORAL
Qty: 90 TABLET | Refills: 0 | Status: SHIPPED | OUTPATIENT
Start: 2024-05-13

## 2024-05-13 NOTE — TELEPHONE ENCOUNTER
Care Due:                  Date            Visit Type   Department     Provider  --------------------------------------------------------------------------------                                EP -                              PRIMARY      [facility]  Last Visit: 02-      CARE ([facility])   MEDICINE       Blossom Vargas  Next Visit: None Scheduled  None         None Found                                                            Last  Test          Frequency    Reason                     Performed    Due Date  --------------------------------------------------------------------------------    CBC.........  12 months..  meloxicam................  12- 12-    CMP.........  12 months..  meloxicam,                 12- 12-                             valsartan-hydrochlorothia                             zide.....................    [employer] Embedded Care Due Messages. Reference number: [id number].   5/13/2024 12:13:07 AM CDT

## 2024-05-13 NOTE — TELEPHONE ENCOUNTER
Refill Routing Note   Medication(s) are not appropriate for processing by [facility] for the following reason(s):        Required labs outdated  No active prescription written by provider    [facility] action(s):  Defer     Requires labs : Yes      Medication Therapy Plan: Procardia- prescription  on 24; DEFER TO PCP      Appointments  past 12m or future 3m with PCP    Date Provider   Last Visit   10/25/2023 Alexsandra Howe MD   Next Visit   Visit date not found Alexsandra Howe MD   ED visits in past 90 days: 0        Note composed:10:28 AM 2024

## 2024-08-23 RX ORDER — VALSARTAN AND HYDROCHLOROTHIAZIDE 320; 25 MG/1; MG/1
1 TABLET, FILM COATED ORAL
Qty: 90 TABLET | Refills: 0 | OUTPATIENT
Start: 2024-08-23

## 2024-08-23 NOTE — TELEPHONE ENCOUNTER
Care Due:                  Date            Visit Type   Department     Provider  --------------------------------------------------------------------------------                                EP -                              PRIMARY      [facility]  Last Visit: 02-      CARE ([facility])   MEDICINE       Blossom Vargas  Next Visit: None Scheduled  None         None Found                                                            Last  Test          Frequency    Reason                     Performed    Due Date  --------------------------------------------------------------------------------    CBC.........  12 months..  meloxicam................  12- 12-    CMP.........  12 months..  meloxicam,                 12- 12-                             valsartan-hydrochlorothia                             zide.....................    [employer] Embedded Care Due Messages. Reference number: [id number].   8/23/2024 12:30:12 AM CDT

## 2024-08-23 NOTE — TELEPHONE ENCOUNTER
Refill Routing Note   Medication(s) are not appropriate for processing by [facility] for the following reason(s):        Required labs outdated    [facility] action(s):  Defer   Requires labs : Yes             Appointments  past 12m or future 3m with PCP    Date Provider   Last Visit   10/25/2023 Alexsandra Howe MD   Next Visit   Visit date not found Alexsandra Howe MD   ED visits in past 90 days: 0        Note composed:2:38 PM 08/23/2024

## 2024-11-13 DIAGNOSIS — I10 ESSENTIAL HYPERTENSION: ICD-10-CM

## 2025-03-06 ENCOUNTER — PATIENT MESSAGE (OUTPATIENT)
Dept: ADMINISTRATIVE | Facility: HOSPITAL | Age: 50
End: 2025-03-06
Payer: MEDICAID

## 2025-08-29 ENCOUNTER — PATIENT MESSAGE (OUTPATIENT)
Dept: ADMINISTRATIVE | Facility: HOSPITAL | Age: 50
End: 2025-08-29
Payer: MEDICAID